# Patient Record
Sex: FEMALE | Race: WHITE | Employment: FULL TIME | URBAN - METROPOLITAN AREA
[De-identification: names, ages, dates, MRNs, and addresses within clinical notes are randomized per-mention and may not be internally consistent; named-entity substitution may affect disease eponyms.]

---

## 2017-06-07 ENCOUNTER — OFFICE VISIT (OUTPATIENT)
Dept: OBGYN CLINIC | Age: 37
End: 2017-06-07
Payer: COMMERCIAL

## 2017-06-07 ENCOUNTER — HOSPITAL ENCOUNTER (OUTPATIENT)
Age: 37
Setting detail: SPECIMEN
Discharge: HOME | End: 2017-06-07
Payer: COMMERCIAL

## 2017-06-07 VITALS
HEART RATE: 70 BPM | SYSTOLIC BLOOD PRESSURE: 112 MMHG | HEIGHT: 64 IN | RESPIRATION RATE: 17 BRPM | BODY MASS INDEX: 24.59 KG/M2 | DIASTOLIC BLOOD PRESSURE: 68 MMHG | WEIGHT: 144 LBS

## 2017-06-07 DIAGNOSIS — Z01.419 WELL FEMALE EXAM WITH ROUTINE GYNECOLOGICAL EXAM: Primary | ICD-10-CM

## 2017-06-07 DIAGNOSIS — Z11.51 SPECIAL SCREENING EXAMINATION FOR HUMAN PAPILLOMAVIRUS (HPV): ICD-10-CM

## 2017-06-07 PROCEDURE — 87624 HPV HI-RISK TYP POOLED RSLT: CPT

## 2017-06-07 PROCEDURE — G0145 SCR C/V CYTO,THINLAYER,RESCR: HCPCS

## 2017-06-07 PROCEDURE — 99395 PREV VISIT EST AGE 18-39: CPT | Performed by: ADVANCED PRACTICE MIDWIFE

## 2017-06-07 ASSESSMENT — PATIENT HEALTH QUESTIONNAIRE - PHQ9
SUM OF ALL RESPONSES TO PHQ9 QUESTIONS 1 & 2: 0
1. LITTLE INTEREST OR PLEASURE IN DOING THINGS: 0
2. FEELING DOWN, DEPRESSED OR HOPELESS: 0
SUM OF ALL RESPONSES TO PHQ QUESTIONS 1-9: 0

## 2017-06-08 LAB
HPV SAMPLE: NORMAL
HPV SOURCE: NORMAL
HPV, GENOTYPE 16: NOT DETECTED
HPV, GENOTYPE 18: NOT DETECTED
HPV, HIGH RISK OTHER: NOT DETECTED
HPV, INTERPRETATION: NORMAL

## 2017-06-09 LAB — CYTOLOGY REPORT: NORMAL

## 2017-11-30 ENCOUNTER — OFFICE VISIT (OUTPATIENT)
Dept: FAMILY MEDICINE CLINIC | Age: 37
End: 2017-11-30
Payer: COMMERCIAL

## 2017-11-30 VITALS
HEART RATE: 95 BPM | WEIGHT: 144 LBS | TEMPERATURE: 98.2 F | HEIGHT: 65 IN | RESPIRATION RATE: 16 BRPM | SYSTOLIC BLOOD PRESSURE: 126 MMHG | DIASTOLIC BLOOD PRESSURE: 84 MMHG | BODY MASS INDEX: 23.99 KG/M2 | OXYGEN SATURATION: 100 %

## 2017-11-30 DIAGNOSIS — J01.10 ACUTE FRONTAL SINUSITIS, RECURRENCE NOT SPECIFIED: Primary | ICD-10-CM

## 2017-11-30 DIAGNOSIS — M79.10 MYALGIA: ICD-10-CM

## 2017-11-30 LAB
INFLUENZA A ANTIBODY: NEGATIVE
INFLUENZA B ANTIBODY: NEGATIVE

## 2017-11-30 PROCEDURE — G8420 CALC BMI NORM PARAMETERS: HCPCS | Performed by: FAMILY MEDICINE

## 2017-11-30 PROCEDURE — G8427 DOCREV CUR MEDS BY ELIG CLIN: HCPCS | Performed by: FAMILY MEDICINE

## 2017-11-30 PROCEDURE — 99213 OFFICE O/P EST LOW 20 MIN: CPT | Performed by: FAMILY MEDICINE

## 2017-11-30 PROCEDURE — 1036F TOBACCO NON-USER: CPT | Performed by: FAMILY MEDICINE

## 2017-11-30 PROCEDURE — 87804 INFLUENZA ASSAY W/OPTIC: CPT | Performed by: FAMILY MEDICINE

## 2017-11-30 PROCEDURE — G8484 FLU IMMUNIZE NO ADMIN: HCPCS | Performed by: FAMILY MEDICINE

## 2017-11-30 RX ORDER — AMOXICILLIN 500 MG/1
500 TABLET, FILM COATED ORAL 3 TIMES DAILY
Qty: 30 TABLET | Refills: 0 | Status: SHIPPED | OUTPATIENT
Start: 2017-11-30 | End: 2017-12-10

## 2017-11-30 RX ORDER — ASCORBIC ACID 500 MG
500 TABLET ORAL DAILY
COMMUNITY

## 2017-11-30 RX ORDER — FLUCONAZOLE 150 MG/1
150 TABLET ORAL ONCE
Qty: 1 TABLET | Refills: 0 | Status: SHIPPED | OUTPATIENT
Start: 2017-11-30 | End: 2017-11-30

## 2017-11-30 RX ORDER — LANOLIN ALCOHOL/MO/W.PET/CERES
3000 CREAM (GRAM) TOPICAL DAILY
COMMUNITY

## 2017-11-30 RX ORDER — MULTIVITAMIN WITH IRON
250 TABLET ORAL DAILY
COMMUNITY

## 2017-11-30 ASSESSMENT — ENCOUNTER SYMPTOMS
EYE ITCHING: 0
WHEEZING: 0
SHORTNESS OF BREATH: 0
CHEST TIGHTNESS: 0
RHINORRHEA: 1
TROUBLE SWALLOWING: 0
NAUSEA: 0
DIARRHEA: 0
VOICE CHANGE: 0
VOMITING: 0
SINUS PAIN: 1
ABDOMINAL PAIN: 0
EYE DISCHARGE: 0
HEMOPTYSIS: 0
EYE REDNESS: 0
SORE THROAT: 0
CONSTIPATION: 0
COUGH: 1
SINUS PRESSURE: 1
BACK PAIN: 0

## 2017-11-30 NOTE — PATIENT INSTRUCTIONS
hot, wet towel or a warm gel pack on your face 3 or 4 times a day for 5 to 10 minutes each time. · Try a decongestant nasal spray like oxymetazoline (Afrin). Do not use it for more than 3 days in a row. Using it for more than 3 days can make your congestion worse. When should you call for help? Call your doctor now or seek immediate medical care if:  · You have new or worse swelling or redness in your face or around your eyes. · You have a new or higher fever. Watch closely for changes in your health, and be sure to contact your doctor if:  · You have new or worse facial pain. · The mucus from your nose becomes thicker (like pus) or has new blood in it. · You are not getting better as expected. Where can you learn more? Go to https://CommitChangepewisameb.[email or web address] org and sign in to your [employer] account. Enter F969 in the [employer] box to learn more about \"Sinusitis: Care Instructions. \"     If you do not have an account, please click on the \"Sign Up Now\" link. Current as of: July 29, 2016  Content Version: 11.3  © 8102-5926 [employer]. Care instructions adapted under license by [facility] ([facility] [facility]). If you have questions about a medical condition or this instruction, always ask your healthcare professional. [place] [address] any warranty or liability for your use of this information. Patient Education        Saline Nasal Washes: Care Instructions  Your Care Instructions  Saline nasal washes help keep the nasal passages open by washing out thick or dried mucus. This simple remedy can help relieve symptoms of allergies, sinusitis, and colds. It also can make the nose feel more comfortable by keeping the mucous membranes moist. You may notice a little burning sensation in your nose the first few times you use the solution, but this usually gets better in a few days. Follow-up care is a key part of your treatment and safety.  Be sure to make and go to all appointments, and call your doctor if you are having problems. It's also a good idea to know your test results and keep a list of the medicines you take. How can you care for yourself at home? · You can buy premixed saline solution in a squeeze bottle or other sinus rinse products at a drugstore. Read and follow the instructions on the label. · You also can make your own saline solution by adding 1 teaspoon of salt and 1 teaspoon of baking soda to 2 cups of distilled water. · If you use a homemade solution, pour a small amount into a clean bowl. Using a rubber bulb syringe, squeeze the syringe and place the tip in the salt water. Pull a small amount of the salt water into the syringe by relaxing your hand. · Sit down with your head tilted slightly back. Do not lie down. Put the tip of the bulb syringe or the squeeze bottle a little way into one of your nostrils. Gently drip or squirt a few drops into the nostril. Repeat with the other nostril. Some sneezing and gagging are normal at first.  · Gently blow your nose. · Wipe the syringe or bottle tip clean after each use. · Repeat this 2 or 3 times a day. · Use nasal washes gently if you have nosebleeds often. When should you call for help? Watch closely for changes in your health, and be sure to contact your doctor if:  · You often get nosebleeds. · You have problems doing the nasal washes. Where can you learn more? Go to https://Graphic Stadiumstephanie.Field [email or web address] org and sign in to your Tenantry Network account. Enter [phone] [id number] [phone] in the [facility] box to learn more about \"Saline Nasal Washes: Care Instructions. \"     If you do not have an account, please click on the \"Sign Up Now\" link. Current as of: May 4, 2017  Content Version: 11.3  © 7371-7450 [employer]. Care instructions adapted under license by [facility] ([facility]).  If you have questions about a medical condition or this instruction, always ask your healthcare professional. [address] any [email or web address] and sign in to your EnLink Geoenergy Serviceshart account. Enter G355 in the Image Searcherhire box to learn more about \"Muscle Aches: Care Instructions. \"     If you do not have an account, please click on the \"Sign Up Now\" link. Current as of: October 14, 2016  Content Version: 11.3  © 6427-5910 Alt[employer] Digital[employer]. Care instructions adapted under license by [facility] ([facility]). If you have questions about a medical condition or this instruction, always ask your healthcare professional. [address] any warranty or liability for your use of this information.

## 2017-11-30 NOTE — PROGRESS NOTES
[address] [facility]   [address] [facility]  [address] [facility] [address]  Dept: [phone]  Dept Fax: [phone]    Sharon Zimmer is a 40 y.o. female who presents today for her medical conditions/complaints as noted below. Sharon Zimmer is c/o of Cough (cough and congestion, thinks might be a sinus infection)        HPI:     Cough   This is a new problem. The current episode started 1 to 4 weeks ago (3 weeks ago felt like a mild flu, then had a cough that is going into her chest and during the past 1 week has had increased head congestion and muscle aches. ). The problem has been unchanged. The problem occurs every few minutes. The cough is non-productive. Associated symptoms include chills, ear congestion, headaches, myalgias, nasal congestion, postnasal drip and rhinorrhea. Pertinent negatives include no chest pain, ear pain, eye redness, fever, hemoptysis, rash, sore throat, shortness of breath, sweats or wheezing. Exacerbated by: bending over. Risk factors for lung disease include occupational exposure. Treatments tried: ibuprofen. The treatment provided moderate relief. There is no history of asthma, bronchitis, environmental allergies or pneumonia.        Past Medical History:   Diagnosis Date    Anxiety     Kidney stones     Vision loss of right eye       Past Surgical History:   Procedure Laterality Date    WISDOM TOOTH EXTRACTION  1999       Family History   Problem Relation Age of Onset    Thyroid Disease Mother     High Blood Pressure Father     Diabetes Father     Migraines Father     High Cholesterol Father     Heart Disease Father     High Blood Pressure Brother     Cancer Maternal Grandmother      spreaded throughout her body    Cancer Maternal Grandfather      asbestis       Social History   Substance Use Topics    Smoking status: Never Smoker    Smokeless tobacco: Never Used    Alcohol use No      Current Outpatient Prescriptions dizziness, weakness and light-headedness. Hematological: Negative for adenopathy. Psychiatric/Behavioral: The patient is not nervous/anxious. Objective:     Physical Exam   Constitutional: She is oriented to person, place, and time. She appears well-developed and well-nourished. No distress. HENT:   Head: Normocephalic. Right Ear: External ear normal.   Left Ear: External ear normal.   Nose: Mucosal edema, rhinorrhea and sinus tenderness present. Right sinus exhibits frontal sinus tenderness. Left sinus exhibits frontal sinus tenderness. Mouth/Throat: Uvula is midline and mucous membranes are normal. No uvula swelling. Posterior oropharyngeal erythema present. No oropharyngeal exudate or posterior oropharyngeal edema. Eyes: Conjunctivae and EOM are normal. Pupils are equal, round, and reactive to light. Right eye exhibits no discharge. Left eye exhibits no discharge. Neck: Normal range of motion. Neck supple. Cardiovascular: Normal rate, regular rhythm and normal heart sounds. No murmur heard. Pulmonary/Chest: Effort normal and breath sounds normal. No respiratory distress. She has no wheezes. She has no rales. Abdominal: Soft. Bowel sounds are normal. She exhibits no distension and no mass. There is no tenderness. Musculoskeletal: Normal range of motion. She exhibits no edema or tenderness. Lymphadenopathy:     She has cervical adenopathy. Neurological: She is alert and oriented to person, place, and time. No cranial nerve deficit. Coordination normal.   Skin: Skin is warm. No rash noted. She is not diaphoretic. Psychiatric: She has a normal mood and affect. Her behavior is normal. Judgment and thought content normal.   Nursing note and vitals reviewed.     /84 (Site: Left Arm, Position: Sitting, Cuff Size: Medium Adult)   Pulse 95   Temp 98.2 °F (36.8 °C) (Oral)   Resp 16   Ht 5' 4.5\" (1.638 m)   Wt 144 lb (65.3 kg)   SpO2 100%   BMI 24.34 kg/m²     Assessment:

## 2018-06-26 ENCOUNTER — OFFICE VISIT (OUTPATIENT)
Dept: OBGYN CLINIC | Age: 38
End: 2018-06-26
Payer: COMMERCIAL

## 2018-06-26 VITALS
RESPIRATION RATE: 16 BRPM | BODY MASS INDEX: 24.07 KG/M2 | WEIGHT: 141 LBS | HEART RATE: 72 BPM | DIASTOLIC BLOOD PRESSURE: 70 MMHG | SYSTOLIC BLOOD PRESSURE: 114 MMHG | HEIGHT: 64 IN

## 2018-06-26 DIAGNOSIS — Z01.419 ENCOUNTER FOR GYNECOLOGICAL EXAMINATION (GENERAL) (ROUTINE) WITHOUT ABNORMAL FINDINGS: Primary | ICD-10-CM

## 2018-06-26 PROCEDURE — 99395 PREV VISIT EST AGE 18-39: CPT | Performed by: ADVANCED PRACTICE MIDWIFE

## 2018-06-26 ASSESSMENT — PATIENT HEALTH QUESTIONNAIRE - PHQ9
2. FEELING DOWN, DEPRESSED OR HOPELESS: 0
SUM OF ALL RESPONSES TO PHQ9 QUESTIONS 1 & 2: 0
1. LITTLE INTEREST OR PLEASURE IN DOING THINGS: 0
SUM OF ALL RESPONSES TO PHQ QUESTIONS 1-9: 0

## 2019-01-02 ENCOUNTER — OFFICE VISIT (OUTPATIENT)
Dept: FAMILY MEDICINE CLINIC | Age: 39
End: 2019-01-02
Payer: COMMERCIAL

## 2019-01-02 VITALS
RESPIRATION RATE: 16 BRPM | WEIGHT: 143 LBS | BODY MASS INDEX: 23.82 KG/M2 | HEART RATE: 76 BPM | OXYGEN SATURATION: 100 % | HEIGHT: 65 IN | TEMPERATURE: 98.6 F | SYSTOLIC BLOOD PRESSURE: 92 MMHG | DIASTOLIC BLOOD PRESSURE: 76 MMHG

## 2019-01-02 DIAGNOSIS — N30.00 ACUTE CYSTITIS WITHOUT HEMATURIA: Primary | ICD-10-CM

## 2019-01-02 LAB
BILIRUBIN, POC: NEGATIVE
BLOOD URINE, POC: ABNORMAL
CLARITY, POC: CLEAR
COLOR, POC: YELLOW
GLUCOSE URINE, POC: NEGATIVE
KETONES, POC: NEGATIVE
LEUKOCYTE EST, POC: ABNORMAL
NITRITE, POC: NEGATIVE
PH, POC: 5.5
PROTEIN, POC: ABNORMAL
SPECIFIC GRAVITY, POC: >=1.03
UROBILINOGEN, POC: ABNORMAL

## 2019-01-02 PROCEDURE — 1036F TOBACCO NON-USER: CPT | Performed by: FAMILY MEDICINE

## 2019-01-02 PROCEDURE — 99214 OFFICE O/P EST MOD 30 MIN: CPT | Performed by: FAMILY MEDICINE

## 2019-01-02 PROCEDURE — G8484 FLU IMMUNIZE NO ADMIN: HCPCS | Performed by: FAMILY MEDICINE

## 2019-01-02 PROCEDURE — G8427 DOCREV CUR MEDS BY ELIG CLIN: HCPCS | Performed by: FAMILY MEDICINE

## 2019-01-02 PROCEDURE — G8420 CALC BMI NORM PARAMETERS: HCPCS | Performed by: FAMILY MEDICINE

## 2019-01-02 PROCEDURE — 81002 URINALYSIS NONAUTO W/O SCOPE: CPT | Performed by: FAMILY MEDICINE

## 2019-01-02 RX ORDER — PHENAZOPYRIDINE HYDROCHLORIDE 100 MG/1
100 TABLET, FILM COATED ORAL 3 TIMES DAILY PRN
Qty: 6 TABLET | Refills: 0 | Status: SHIPPED | OUTPATIENT
Start: 2019-01-02 | End: 2019-01-04

## 2019-01-02 RX ORDER — FLUCONAZOLE 150 MG/1
TABLET ORAL
Qty: 2 TABLET | Refills: 1 | Status: SHIPPED | OUTPATIENT
Start: 2019-01-02 | End: 2021-06-29

## 2019-01-02 RX ORDER — NITROFURANTOIN 25; 75 MG/1; MG/1
100 CAPSULE ORAL 2 TIMES DAILY
Qty: 20 CAPSULE | Refills: 0 | Status: SHIPPED | OUTPATIENT
Start: 2019-01-02 | End: 2019-01-12

## 2019-01-02 ASSESSMENT — ENCOUNTER SYMPTOMS
GASTROINTESTINAL NEGATIVE: 1
EYES NEGATIVE: 1
ALLERGIC/IMMUNOLOGIC NEGATIVE: 1
RESPIRATORY NEGATIVE: 1

## 2019-06-26 ENCOUNTER — OFFICE VISIT (OUTPATIENT)
Dept: OBGYN CLINIC | Age: 39
End: 2019-06-26
Payer: COMMERCIAL

## 2019-06-26 ENCOUNTER — HOSPITAL ENCOUNTER (OUTPATIENT)
Age: 39
Setting detail: SPECIMEN
Discharge: HOME | End: 2019-06-26
Payer: COMMERCIAL

## 2019-06-26 VITALS
HEIGHT: 64 IN | HEART RATE: 78 BPM | WEIGHT: 146 LBS | DIASTOLIC BLOOD PRESSURE: 72 MMHG | SYSTOLIC BLOOD PRESSURE: 104 MMHG | BODY MASS INDEX: 24.92 KG/M2

## 2019-06-26 DIAGNOSIS — Z01.419 WOMEN'S ANNUAL ROUTINE GYNECOLOGICAL EXAMINATION: Primary | ICD-10-CM

## 2019-06-26 DIAGNOSIS — Z01.419 WOMEN'S ANNUAL ROUTINE GYNECOLOGICAL EXAMINATION: ICD-10-CM

## 2019-06-26 DIAGNOSIS — Z11.51 SCREENING FOR HPV (HUMAN PAPILLOMAVIRUS): ICD-10-CM

## 2019-06-26 PROCEDURE — 87624 HPV HI-RISK TYP POOLED RSLT: CPT

## 2019-06-26 PROCEDURE — 99395 PREV VISIT EST AGE 18-39: CPT | Performed by: NURSE PRACTITIONER

## 2019-06-26 PROCEDURE — G0145 SCR C/V CYTO,THINLAYER,RESCR: HCPCS

## 2019-06-26 ASSESSMENT — PATIENT HEALTH QUESTIONNAIRE - PHQ9
2. FEELING DOWN, DEPRESSED OR HOPELESS: 0
SUM OF ALL RESPONSES TO PHQ9 QUESTIONS 1 & 2: 0
1. LITTLE INTEREST OR PLEASURE IN DOING THINGS: 0
SUM OF ALL RESPONSES TO PHQ QUESTIONS 1-9: 0
SUM OF ALL RESPONSES TO PHQ QUESTIONS 1-9: 0

## 2019-06-26 NOTE — PROGRESS NOTES
History and Physical  [address], [address] [place] [address], [id number] [place] [address] [phone]   Fax [phone]  Jolie Blevins  2019              45 y.o. Chief Complaint   Patient presents with    Gynecologic Exam       Patient's last menstrual period was 2019 (exact date). Primary Care Physician: No primary care provider on file. The patient was seen and examined. She has no chief complaint today and is here for her annual exam.  Her bowels are regular. There are no voiding complaints. She denies any bloating. She denies vaginal discharge and was counseled on STD's and the need for barrier contraception.      HPI : Jolie Blevins is a 45 y.o. female     Annual exam  No chief complaint  ________________________________________________________________________  OB History    Para Term  AB Living   2 2 2 0 0 2   SAB TAB Ectopic Molar Multiple Live Births   0 0 0 0 0 2      # Outcome Date GA Lbr Matheus/2nd Weight Sex Delivery Anes PTL Lv   2 Term 09 39w5d  7 lb 10 oz (3.459 kg) M Vag-Spont   XIAO      Name: Montse Jimenez: 8  Apgar5: 9   1 Term 06 39w0d  6 lb 4 oz (2.835 kg) F    XIAO      Name: Jeferson Page      Past Medical History:   Diagnosis Date    Anxiety     Kidney stones     Vision loss of right eye                                                                    Past Surgical History:   Procedure Laterality Date    WISDOM TOOTH EXTRACTION       Family History   Problem Relation Age of Onset    Thyroid Disease Mother     High Blood Pressure Father     Diabetes Father     Migraines Father     High Cholesterol Father     Heart Disease Father     High Blood Pressure Brother     Cancer Maternal Grandmother         spreaded throughout her body    Cancer Maternal Grandfather         asbestis     Social History     Socioeconomic History    Marital status:      Spouse name: Not on file    Number of children: Not on file    Years of education: Not on file    Highest education level: Not on file   Occupational History    Not on file   Social Needs    Financial resource strain: Not on file    Food insecurity:     Worry: Not on file     Inability: Not on file    Transportation needs:     Medical: Not on file     Non-medical: Not on file   Tobacco Use    Smoking status: Never Smoker    Smokeless tobacco: Never Used   Substance and Sexual Activity    Alcohol use: No    Drug use: No    Sexual activity: Yes     Partners: Male   Lifestyle    Physical activity:     Days per week: Not on file     Minutes per session: Not on file    Stress: Not on file   Relationships    Social connections:     Talks on phone: Not on file     Gets together: Not on file     Attends Oriental orthodox service: Not on file     Active member of club or organization: Not on file     Attends meetings of clubs or organizations: Not on file     Relationship status: Not on file    Intimate partner violence:     Fear of current or ex partner: Not on file     Emotionally abused: Not on file     Physically abused: Not on file     Forced sexual activity: Not on file   Other Topics Concern    Not on file   Social History Narrative    Not on file       MEDICATIONS:  Current Outpatient Medications   Medication Sig Dispense Refill    vitamin B-12 (CYANOCOBALAMIN) 1000 MCG tablet Take 3,000 mcg by mouth daily      vitamin C (ASCORBIC ACID) 500 MG tablet Take 500 mg by mouth daily      magnesium (MAGNESIUM-OXIDE) 250 MG TABS tablet Take 250 mg by mouth daily      Multiple Vitamin TABS Take 1 tablet by mouth.  fluconazole (DIFLUCAN) 150 MG tablet Take 1 tablet after antibiotic use. . Then take 1 in 7 days. . 2 tablet 1     No current facility-administered medications for this visit.             ALLERGIES:  Allergies as of 06/26/2019 - Review Complete 06/26/2019   Allergen Reaction Noted    Bactrim [sulfamethoxazole-trimethoprim] Rash 12/08/2016       Symptoms of decreased mood absent  Symptoms of anhedonia absent    **If either question is answered in a  positive fashion then complete the PHQ9 Scoring Evaluation and make the appropriate referral**      Immunization status: stated as current, but no records available. Gynecologic History:  Menarche: 15 yo  Menopause at [address] [place] yo     Patient's last menstrual period was 06/04/2019 (exact date). Sexually Active: Yes    STD History: No     Permanent Sterilization: No ( with vasectomy)  Reversible Birth Control: No        Hormone Replacement Exposure: No      Genetic Qualified Family History of Breast, Ovarian , Colon or Uterine Cancer: No     If YES see scanned worksheet. Preventative Health Testing:    Health Maintenance:  Health Maintenance Due   Topic Date Due    Varicella Vaccine (1 of 2 - 13+ 2-dose series) 08/15/1993    HIV screen  08/15/1995    DTaP/Tdap/Td vaccine (1 - Tdap) 08/15/1999       Date of Last Pap Smear: 6/7/2017 neg/neg  Abnormal Pap Smear History: denies  Colposcopy History:   Date of Last Mammogram: NA  Date of Last Colonoscopy:   Date of Last Bone Density:      ________________________________________________________________________  REVIEW OF SYSTEMS:    yes   A minimum of an eleven point review of systems was completed. Review Of Systems (11 point):  Constitutional: No fever, chills or malaise;  No weight change or fatigue  Head and Eyes: No vision, Headache, Dizziness or trauma in last 12 months  ENT ROS: No hearing, Tinnitis, sinus or taste problems  Hematological and Lymphatic ROS:No Lymphoma, Von Willebrand's, Hemophillia or Bleeding History  Psych ROS: No Depression, Homicidal thoughts,suicidal thoughts, or anxiety  Breast ROS: No prior breast abnormalities or lumps  Respiratory ROS: No SOB, Pneumoniae,Cough, or Pulmonary Embolism History  Cardiovascular ROS: No Chest Pain with Exertion, Palpitations, Syncope, Edema, Arrhythmia  Gastrointestinal ROS: applicable. Extremities: The patients extremities were without calf tenderness, edema, or varicosities. There was full range of motion in all four extremities. Pulses in all four extremities were appreciated and are 2/4. Abdomen: The abdomen was soft and non-tender. There were good bowel sounds in all quadrants and there was no guarding, rebound or rigidity. On evaluation there was no evidence of hepatosplenomegaly and there was no costal vertebral ivette tenderness bilaterally. No hernias were appreciated. Abdominal Scars: intact    Psych: The patient had a normal Orientation to: Time, Place, Person, and Situation  There is no Mood / Affect changes    Breast:  (Chest)  normal appearance, no masses or tenderness  Self breast exams were reviewed in detail. Literature was given. Pelvic Exam:  Vulva and vagina appear normal. Bimanual exam reveals normal uterus and adnexa. Rectal Exam:  exam declined by patient          Musculosk:  Normal Gait and station was noted. Digits were evaluated without abnormal findings. Range of motion, stability and strength were evaluated and found to be appropriate for the patients age. ASSESSMENT:      45 y.o. Annual   Diagnosis Orders   1. Women's annual routine gynecological examination  PAP SMEAR   2. Screening for HPV (human papillomavirus)  PAP SMEAR          Chief Complaint   Patient presents with    Gynecologic Exam          Past Medical History:   Diagnosis Date    Anxiety     Kidney stones     Vision loss of right eye          Patient Active Problem List    Diagnosis Date Noted    Anxiety     Sudden visual loss 11/06/2013    Acute retrobulbar neuritis 11/06/2013    Regular astigmatism 07/17/2013    Myopia 07/17/2013          Hereditary Breast, Ovarian, Colon and Uterine Cancer screening Done.           Tobacco & Secondary smoke risks reviewed; instructed on cessation and avoidance      Counseling Completed:  Preventative Health Recommendations and Follow up. The patient was informed of the recommended preventative health recommendations. 1. Annuals every year; Cytology collections per prevailing guidelines. 2. Mammograms begin every year at 35 yo if no abnormalities are found and no family     History. 3. Bone density studies every 2-3 years. Begin at 73 yo. If no fracture history or osteoporosis family history. (significant). 4. Colonoscopy begin at 40 yo. Repeat every ten years if negative and no family history. 5. Calcium of 3874-2153 mg/day in split dosing  6. Vitamin D 400-800 IU/day  7. All other preventative health recommendations will be managed by the patients Primary care physician. PLAN:  Return in about 1 year (around 6/26/2020) for annual.   Pap smear obtained. Repeat Annual every 1 year  Cervical Cytology Evaluation begins at 24years old. If Negative Cytology, Follow-up screening per current guidelines. Mammograms every 1 year. If 35 yo and last mammogram was negative. Calcium and Vitamin D dosing reviewed. Colonoscopy screening reviewed as well as onset for bone density testing. Birth control and barrier recommendations discussed. STD counseling and prevention reviewed. Gardisil counseling completed for all patients 10-35 yo. Routine health maintenance per patients PCP. Orders Placed This Encounter   Procedures    PAP SMEAR     Patient History:    Patient's last menstrual period was 06/04/2019 (exact date). OBGYN Status: Having periods  Past Surgical History:  1999: WISDOM TOOTH EXTRACTION      Social History    Tobacco Use      Smoking status: Never Smoker      Smokeless tobacco: Never Used       Standing Status:   Future     Standing Expiration Date:   6/25/2020     Order Specific Question:   Collection Type     Answer:    Thin Prep     Order Specific Question:   Prior Abnormal Pap Test     Answer:   No     Order Specific Question:   Screening or Diagnostic     Answer:   Screening Order Specific Question:   HPV Requested?      Answer:   Yes     Order Specific Question:   High Risk Patient     Answer:   N/A

## 2019-06-29 LAB
HPV SAMPLE: NORMAL
HPV, GENOTYPE 16: NOT DETECTED
HPV, GENOTYPE 18: NOT DETECTED
HPV, HIGH RISK OTHER: NOT DETECTED
HPV, INTERPRETATION: NORMAL
SPECIMEN DESCRIPTION: NORMAL

## 2019-07-01 LAB — CYTOLOGY REPORT: NORMAL

## 2019-07-03 ENCOUNTER — TELEPHONE (OUTPATIENT)
Dept: OBGYN CLINIC | Age: 39
End: 2019-07-03

## 2019-07-03 NOTE — TELEPHONE ENCOUNTER
----- Message from AMADOU Campo CNP sent at 7/3/2019  7:06 AM EDT -----  Pap smear ASCUS  HPV not detected  AGE 38  Repeat pap smear in 1 year. Please let patient know.

## 2019-07-03 NOTE — TELEPHONE ENCOUNTER
Spoke with patient in regards to pap results, ascus with negative hpv, per Gabriele Morton CNP. Patient to repeat pap in one year as recommended by provider. Patient verbalized an understanding of results and plan of care.

## 2019-09-10 ENCOUNTER — OFFICE VISIT (OUTPATIENT)
Dept: FAMILY MEDICINE CLINIC | Age: 39
End: 2019-09-10
Payer: COMMERCIAL

## 2019-09-10 ENCOUNTER — HOSPITAL ENCOUNTER (OUTPATIENT)
Age: 39
Setting detail: SPECIMEN
Discharge: HOME | End: 2019-09-10
Payer: COMMERCIAL

## 2019-09-10 VITALS
DIASTOLIC BLOOD PRESSURE: 78 MMHG | OXYGEN SATURATION: 99 % | WEIGHT: 144 LBS | BODY MASS INDEX: 24.72 KG/M2 | TEMPERATURE: 98.8 F | HEART RATE: 98 BPM | SYSTOLIC BLOOD PRESSURE: 116 MMHG

## 2019-09-10 DIAGNOSIS — R10.9 FLANK PAIN: ICD-10-CM

## 2019-09-10 DIAGNOSIS — R39.9 UTI SYMPTOMS: ICD-10-CM

## 2019-09-10 DIAGNOSIS — R39.9 UTI SYMPTOMS: Primary | ICD-10-CM

## 2019-09-10 LAB
-: ABNORMAL
AMORPHOUS: ABNORMAL
BACTERIA: ABNORMAL
BILIRUBIN URINE: NEGATIVE
BILIRUBIN, POC: ABNORMAL
BLOOD URINE, POC: ABNORMAL
CASTS UA: ABNORMAL /LPF (ref 0–8)
CLARITY, POC: ABNORMAL
COLOR, POC: YELLOW
COLOR: ABNORMAL
COMMENT UA: ABNORMAL
CONTROL: YES
CRYSTALS, UA: ABNORMAL /HPF
EPITHELIAL CELLS UA: ABNORMAL /HPF (ref 0–5)
GLUCOSE URINE, POC: ABNORMAL
GLUCOSE URINE: NEGATIVE
KETONES, POC: ABNORMAL
KETONES, URINE: NEGATIVE
LEUKOCYTE EST, POC: ABNORMAL
LEUKOCYTE ESTERASE, URINE: ABNORMAL
MUCUS: ABNORMAL
NITRITE, POC: ABNORMAL
NITRITE, URINE: POSITIVE
OTHER OBSERVATIONS UA: ABNORMAL
PH UA: 6.5 (ref 5–8)
PH, POC: 5.5
PREGNANCY TEST URINE, POC: NORMAL
PROTEIN UA: ABNORMAL
PROTEIN, POC: 30
RBC UA: ABNORMAL /HPF (ref 0–4)
RENAL EPITHELIAL, UA: ABNORMAL /HPF
SPECIFIC GRAVITY UA: 1.03 (ref 1–1.03)
SPECIFIC GRAVITY, POC: 1.03
TRICHOMONAS: ABNORMAL
TURBIDITY: ABNORMAL
URINE HGB: ABNORMAL
UROBILINOGEN, POC: 0.2
UROBILINOGEN, URINE: NORMAL
WBC UA: ABNORMAL /HPF (ref 0–5)
YEAST: ABNORMAL

## 2019-09-10 PROCEDURE — G8427 DOCREV CUR MEDS BY ELIG CLIN: HCPCS | Performed by: NURSE PRACTITIONER

## 2019-09-10 PROCEDURE — 81025 URINE PREGNANCY TEST: CPT | Performed by: NURSE PRACTITIONER

## 2019-09-10 PROCEDURE — 81002 URINALYSIS NONAUTO W/O SCOPE: CPT | Performed by: NURSE PRACTITIONER

## 2019-09-10 PROCEDURE — 99214 OFFICE O/P EST MOD 30 MIN: CPT | Performed by: NURSE PRACTITIONER

## 2019-09-10 PROCEDURE — 1036F TOBACCO NON-USER: CPT | Performed by: NURSE PRACTITIONER

## 2019-09-10 PROCEDURE — G8420 CALC BMI NORM PARAMETERS: HCPCS | Performed by: NURSE PRACTITIONER

## 2019-09-10 RX ORDER — CIPROFLOXACIN 500 MG/1
500 TABLET, FILM COATED ORAL 2 TIMES DAILY
Qty: 20 TABLET | Refills: 0 | Status: SHIPPED | OUTPATIENT
Start: 2019-09-10 | End: 2019-09-20

## 2019-09-10 ASSESSMENT — ENCOUNTER SYMPTOMS
COUGH: 0
CHEST TIGHTNESS: 0
SORE THROAT: 0
EYES NEGATIVE: 1
VOMITING: 0
BOWEL INCONTINENCE: 0
DIARRHEA: 0
EYE ITCHING: 0
ALLERGIC/IMMUNOLOGIC NEGATIVE: 1
NAUSEA: 1
ABDOMINAL PAIN: 0
EYE DISCHARGE: 0
SHORTNESS OF BREATH: 0

## 2019-09-10 NOTE — PROGRESS NOTES
POC Neg     Bilirubin, UA Neg     Ketones, UA Neg     Spec Grav, UA 1.030     Blood, UA POC Trace-intact     pH, UA 5.5     Protein, UA POC 30     Urobilinogen, UA 0.2     Leukocytes, UA Large     Nitrite, UA Pos        Assessment:       Diagnosis Orders   1. UTI symptoms  POCT Urinalysis no Micro    Urinalysis Reflex to Culture    POCT urine pregnancy    ciprofloxacin (CIPRO) 500 MG tablet    CT ABDOMEN PELVIS WO CONTRAST   2. Flank pain  POCT Urinalysis no Micro    Urinalysis Reflex to Culture    POCT urine pregnancy    CT ABDOMEN PELVIS WO CONTRAST             Plan:     1.) POCT U/A- noted above   2.) Urine Culture sent to lab- will call with results   3.) POCT Preg- negative   4.) Start Cipro today   5.) Increase water intake   6.) Stat CT Abd/pelvis- will call with results and TX accordingly   7.) RTO PRN   8.) Follow-up with PCP PRN     Problem List     None           Patient given educationalmaterials - see patient instructions. Discussed use, benefit, and side effectsof prescribed medications. All patient questions answered. Pt verbalized understanding. Instructed to continue current medications, diet and exercise. Patient agreedwith treatment plan. Follow up as directed.      Electronically signed by Hetty Nyhan, APRN - CNP on 9/10/2019 at 10:18 AM

## 2019-09-12 LAB
CULTURE: ABNORMAL
Lab: ABNORMAL
SPECIMEN DESCRIPTION: ABNORMAL

## 2019-12-02 ENCOUNTER — OFFICE VISIT (OUTPATIENT)
Dept: FAMILY MEDICINE CLINIC | Age: 39
End: 2019-12-02
Payer: COMMERCIAL

## 2019-12-02 VITALS
OXYGEN SATURATION: 100 % | SYSTOLIC BLOOD PRESSURE: 110 MMHG | TEMPERATURE: 98.9 F | WEIGHT: 144 LBS | DIASTOLIC BLOOD PRESSURE: 79 MMHG | HEART RATE: 96 BPM | BODY MASS INDEX: 24.72 KG/M2

## 2019-12-02 DIAGNOSIS — J01.90 ACUTE NON-RECURRENT SINUSITIS, UNSPECIFIED LOCATION: Primary | ICD-10-CM

## 2019-12-02 PROCEDURE — 99214 OFFICE O/P EST MOD 30 MIN: CPT | Performed by: FAMILY MEDICINE

## 2019-12-02 RX ORDER — AMOXICILLIN 500 MG/1
500 CAPSULE ORAL 3 TIMES DAILY
Qty: 30 CAPSULE | Refills: 0 | Status: SHIPPED | OUTPATIENT
Start: 2019-12-02 | End: 2019-12-12

## 2019-12-02 ASSESSMENT — ENCOUNTER SYMPTOMS
NAUSEA: 0
SINUS PRESSURE: 1
SINUS PAIN: 1
SORE THROAT: 1
SHORTNESS OF BREATH: 0
SINUS COMPLAINT: 1
VOMITING: 0
DIARRHEA: 0
COUGH: 0

## 2020-02-26 ENCOUNTER — OFFICE VISIT (OUTPATIENT)
Dept: FAMILY MEDICINE CLINIC | Age: 40
End: 2020-02-26
Payer: COMMERCIAL

## 2020-02-26 VITALS
SYSTOLIC BLOOD PRESSURE: 129 MMHG | BODY MASS INDEX: 25.1 KG/M2 | WEIGHT: 147 LBS | OXYGEN SATURATION: 99 % | RESPIRATION RATE: 16 BRPM | DIASTOLIC BLOOD PRESSURE: 81 MMHG | TEMPERATURE: 99.7 F | HEART RATE: 99 BPM | HEIGHT: 64 IN

## 2020-02-26 PROCEDURE — G8427 DOCREV CUR MEDS BY ELIG CLIN: HCPCS | Performed by: FAMILY MEDICINE

## 2020-02-26 PROCEDURE — 1036F TOBACCO NON-USER: CPT | Performed by: FAMILY MEDICINE

## 2020-02-26 PROCEDURE — 99214 OFFICE O/P EST MOD 30 MIN: CPT | Performed by: FAMILY MEDICINE

## 2020-02-26 PROCEDURE — G8419 CALC BMI OUT NRM PARAM NOF/U: HCPCS | Performed by: FAMILY MEDICINE

## 2020-02-26 PROCEDURE — G8484 FLU IMMUNIZE NO ADMIN: HCPCS | Performed by: FAMILY MEDICINE

## 2020-02-26 RX ORDER — AMOXICILLIN AND CLAVULANATE POTASSIUM 875; 125 MG/1; MG/1
1 TABLET, FILM COATED ORAL 2 TIMES DAILY
Qty: 14 TABLET | Refills: 0 | Status: SHIPPED | OUTPATIENT
Start: 2020-02-26 | End: 2020-03-04

## 2020-02-26 ASSESSMENT — PATIENT HEALTH QUESTIONNAIRE - PHQ9: DEPRESSION UNABLE TO ASSESS: PT REFUSES

## 2020-02-26 ASSESSMENT — ENCOUNTER SYMPTOMS
SINUS PRESSURE: 1
SHORTNESS OF BREATH: 0
VOMITING: 0
NAUSEA: 1
WHEEZING: 0
CHANGE IN BOWEL HABIT: 0
SORE THROAT: 1
SINUS PAIN: 1
ABDOMINAL PAIN: 0
COUGH: 0
DIARRHEA: 0

## 2020-02-26 NOTE — LETTER
[facility]   [address] [facility]  Phone: [phone]  Fax: [phone]    Tru Good MD        February 26, 2020     Patient: Ronan Kiser   YOB: 1980   Date of Visit: 2/26/2020       To Whom it May Concern:    Lucy Rosas was seen in my clinic on 2/26/2020. She is to be excused from work on 2/26/2020. If you have any questions or concerns, please don't hesitate to call.     Sincerely,           Tru Good MD

## 2020-02-27 NOTE — PROGRESS NOTES
[address] [facility]   [place] [facility] K[facility]  Dept: [phone]  Dept Fax: [phone]    Donita Saldana is a 44 y.o. female who presents today for her medical conditions/complaintsas noted below. Donita Saldana is c/o of Pharyngitis (headache, sinus congestion, left ear pain x two days )        HPI:     Pharyngitis   This is a new problem. The current episode started yesterday. The problem occurs constantly. The problem has been rapidly worsening. Associated symptoms include congestion, a fever (unmeasured), headaches, nausea and a sore throat. Pertinent negatives include no abdominal pain, change in bowel habit, chills, coughing, myalgias, rash or vomiting. Associated symptoms comments: Left ear pain. Exacerbated by: laying flat. She has tried NSAIDs for the symptoms. The treatment provided mild relief. 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   Past Medical History:   Diagnosis Date    Anxiety     Kidney stones     Vision loss of right eye     Past medical history reviewed and pertinent positives/negatives in the HPI    Past Surgical History:   Procedure Laterality Date    WISDOM TOOTH EXTRACTION  1999       Family History   Problem Relation Age of Onset    Thyroid Disease Mother     High Blood Pressure Father     Diabetes Father     Migraines Father     High Cholesterol Father     Heart Disease Father     High Blood Pressure Brother     Cancer Maternal Grandmother         spreaded throughout her body    Cancer Maternal Grandfather         asbestis       Social History     Tobacco Use    Smoking status: Never Smoker    Smokeless tobacco: Never Used   Substance Use Topics    Alcohol use: No      Current Outpatient Medications   Medication Sig Dispense Refill    amoxicillin-clavulanate (AUGMENTIN) 875-125 MG per tablet Take 1 tablet by mouth 2 times daily for 7 days 14 tablet 0    vitamin B-12 (CYANOCOBALAMIN) 1000 MCG tablet Take 3,000 mcg by mouth daily      vitamin C (ASCORBIC ACID) 500 MG tablet Take 500 mg by mouth daily      magnesium (MAGNESIUM-OXIDE) 250 MG TABS tablet Take 250 mg by mouth daily      Multiple Vitamin TABS Take 1 tablet by mouth.  fluconazole (DIFLUCAN) 150 MG tablet Take 1 tablet after antibiotic use. . Then take 1 in 7 days. . (Patient not taking: Reported on 9/10/2019) 2 tablet 1     No current facility-administered medications for this visit. Allergies   Allergen Reactions    Bactrim [Sulfamethoxazole-Trimethoprim] Rash       Health Maintenance   Topic Date Due    Varicella vaccine (1 of 2 - 2-dose childhood series) 08/15/1981    DTaP/Tdap/Td vaccine (1 - Tdap) 08/15/1991    HIV screen  08/15/1995    Flu vaccine (1) 09/01/2019    Cervical cancer screen  06/26/2024    Shingles Vaccine (1 of 2) 08/15/2030    Hepatitis A vaccine  Aged Out    Hepatitis B vaccine  Aged Out    Hib vaccine  Aged Out    Meningococcal (ACWY) vaccine  Aged Out    Pneumococcal 0-64 years Vaccine  Aged Out       :      Review of Systems   Constitutional: Positive for fever (unmeasured). Negative for chills. HENT: Positive for congestion, ear pain, sinus pressure, sinus pain and sore throat. Respiratory: Negative for cough, shortness of breath and wheezing. Gastrointestinal: Positive for nausea. Negative for abdominal pain, change in bowel habit, diarrhea and vomiting. Musculoskeletal: Negative for myalgias. Skin: Negative for rash and wound. Neurological: Positive for headaches. Hematological: Negative for adenopathy. Objective:     Physical Exam  Vitals signs and nursing note reviewed. Constitutional:       Appearance: Normal appearance. She is ill-appearing. HENT:      Head: Normocephalic and atraumatic. Right Ear: Hearing, ear canal and external ear normal. A middle ear effusion is present. Tympanic membrane is bulging.       Left Ear: Hearing, ear canal and external ear normal. A middle ear effusion is present. Tympanic membrane is bulging. Nose: Mucosal edema and congestion present. Right Sinus: Frontal sinus tenderness present. No maxillary sinus tenderness. Left Sinus: Frontal sinus tenderness present. No maxillary sinus tenderness. Mouth/Throat:      Lips: Pink. Mouth: Mucous membranes are moist.      Pharynx: Pharyngeal swelling and posterior oropharyngeal erythema present. No oropharyngeal exudate. Tonsils: No tonsillar exudate. Eyes:      Extraocular Movements: Extraocular movements intact. Conjunctiva/sclera: Conjunctivae normal.   Neck:      Musculoskeletal: No muscular tenderness. Cardiovascular:      Rate and Rhythm: Normal rate and regular rhythm. Heart sounds: Normal heart sounds. Pulmonary:      Effort: Pulmonary effort is normal.      Breath sounds: Normal breath sounds. Lymphadenopathy:      Cervical: No cervical adenopathy. Skin:     General: Skin is warm and dry. Neurological:      Mental Status: She is alert and oriented to person, place, and time. Mental status is at baseline. Psychiatric:         Mood and Affect: Mood normal.         Behavior: Behavior normal.         Thought Content: Thought content normal.         Judgment: Judgment normal.       /81 (Site: Left Upper Arm, Position: Sitting, Cuff Size: Medium Adult)   Pulse 99   Temp 99.7 °F (37.6 °C) (Oral)   Resp 16   Ht 5' 4\" (1.626 m)   Wt 147 lb (66.7 kg)   SpO2 99%   BMI 25.23 kg/m²     Assessment:       Diagnosis Orders   1. Acute bacterial sinusitis         Plan:   Due to severity and rapid worsening of symptoms we will treat as a bacterial infection at this time  1.) Take Augmentin as prescribed for sinus infection. Continue Tylenol/Motrin as needed. May use Flonase or nasal saline to help with congestion. Drink plenty of fluids.   2.)If symptoms worsen or do not improve please follow-up with PCP or return to clinic    No orders of the defined types were placed in this encounter. Orders Placed This Encounter   Medications    amoxicillin-clavulanate (AUGMENTIN) 875-125 MG per tablet     Sig: Take 1 tablet by mouth 2 times daily for 7 days     Dispense:  14 tablet     Refill:  0      Patient given educational materials - see patient instructions. Discussed use, benefit, and side effects of prescribed medications. All patient questions answered. Pt voiced understanding. Patient agreed with treatment plan. Follow up as directed.      Electronicallysigned by Chelsea Gray MD on 2/26/2020 at 7:26 PM

## 2020-02-27 NOTE — PATIENT INSTRUCTIONS
Patient Education        Sinusitis: Care Instructions  Your Care Instructions    Sinusitis is an infection of the lining of the sinus cavities in your head. Sinusitis often follows a cold. It causes pain and pressure in your head and face. In most cases, sinusitis gets better on its own in 1 to 2 weeks. But some mild symptoms may last for several weeks. Sometimes antibiotics are needed. Follow-up care is a key part of your treatment and safety. Be sure to make and go to all appointments, and call your doctor if you are having problems. It's also a good idea to know your test results and keep a list of the medicines you take. How can you care for yourself at home? · Take an over-the-counter pain medicine, such as acetaminophen (Tylenol), ibuprofen (Advil, Motrin), or naproxen (Aleve). Read and follow all instructions on the label. · If the doctor prescribed antibiotics, take them as directed. Do not stop taking them just because you feel better. You need to take the full course of antibiotics. · Be careful when taking over-the-counter cold or flu medicines and Tylenol at the same time. Many of these medicines have acetaminophen, which is Tylenol. Read the labels to make sure that you are not taking more than the recommended dose. Too much acetaminophen (Tylenol) can be harmful. · Breathe warm, moist air from a steamy shower, a hot bath, or a sink filled with hot water. Avoid cold, dry air. Using a humidifier in your home may help. Follow the directions for cleaning the machine. · Use saline (saltwater) nasal washes to help keep your nasal passages open and wash out mucus and bacteria. You can buy saline nose drops at a grocery store or drugstore. Or you can make your own at home by adding 1 teaspoon of salt and 1 teaspoon of baking soda to 2 cups of distilled water. If you make your own, fill a bulb syringe with the solution, insert the tip into your nostril, and squeeze gently. Meier Ledesma your nose.   · Put a hot, wet towel or a warm gel pack on your face 3 or 4 times a day for 5 to 10 minutes each time. · Try a decongestant nasal spray like oxymetazoline (Afrin). Do not use it for more than 3 days in a row. Using it for more than 3 days can make your congestion worse. When should you call for help? Call your doctor now or seek immediate medical care if:    · You have new or worse swelling or redness in your face or around your eyes.     · You have a new or higher fever.    Watch closely for changes in your health, and be sure to contact your doctor if:    · You have new or worse facial pain.     · The mucus from your nose becomes thicker (like pus) or has new blood in it.     · You are not getting better as expected. Where can you learn more? Go to https://[employer]igerlily. org and sign in to your [employer] account. Enter T734 in the Onsite Care box to learn more about \"Sinusitis: Care Instructions. \"     If you do not have an account, please click on the \"Sign Up Now\" link. Current as of: July 28, 2019  Content Version: 12.3  © 3372-0954 Healthwise, [employer]. Care instructions adapted under license by [facility] ([facility]). If you have questions about a medical condition or this instruction, always ask your healthcare professional. [address] any warranty or liability for your use of this information. 1.) Take Augmentin as prescribed for sinus infection. Continue Tylenol/Motrin as needed. May use Flonase or nasal saline to help with congestion. Drink plenty of fluids.   2.)If symptoms worsen or do not improve please follow-up with PCP or return to clinic

## 2020-06-29 ENCOUNTER — OFFICE VISIT (OUTPATIENT)
Dept: OBGYN CLINIC | Age: 40
End: 2020-06-29
Payer: COMMERCIAL

## 2020-06-29 ENCOUNTER — HOSPITAL ENCOUNTER (OUTPATIENT)
Age: 40
Setting detail: SPECIMEN
Discharge: HOME | End: 2020-06-29
Payer: COMMERCIAL

## 2020-06-29 VITALS
SYSTOLIC BLOOD PRESSURE: 108 MMHG | DIASTOLIC BLOOD PRESSURE: 70 MMHG | BODY MASS INDEX: 24.92 KG/M2 | WEIGHT: 146 LBS | HEIGHT: 64 IN | TEMPERATURE: 97.7 F

## 2020-06-29 PROCEDURE — 88175 CYTOPATH C/V AUTO FLUID REDO: CPT

## 2020-06-29 PROCEDURE — 99395 PREV VISIT EST AGE 18-39: CPT | Performed by: NURSE PRACTITIONER

## 2020-06-29 PROCEDURE — 87624 HPV HI-RISK TYP POOLED RSLT: CPT

## 2020-06-29 ASSESSMENT — PATIENT HEALTH QUESTIONNAIRE - PHQ9
SUM OF ALL RESPONSES TO PHQ9 QUESTIONS 1 & 2: 0
SUM OF ALL RESPONSES TO PHQ QUESTIONS 1-9: 0
1. LITTLE INTEREST OR PLEASURE IN DOING THINGS: 0
SUM OF ALL RESPONSES TO PHQ QUESTIONS 1-9: 0
2. FEELING DOWN, DEPRESSED OR HOPELESS: 0

## 2020-06-29 NOTE — PROGRESS NOTES
History and Physical  [address], [address], [place] [address]. [phone]   Fax [phone]  Rey Reis  2020              44 y.o. Chief Complaint   Patient presents with    Gynecologic Exam       Patient's last menstrual period was 2020. Primary Care Physician: No primary care provider on file. The patient was seen and examined. She has no chief complaint today and is here for her annual exam.  Her bowels are regular. There are no voiding complaints. She denies any bloating. She denies vaginal discharge and was counseled on STD's and the need for barrier contraception.      HPI : Rey Reis is a 44 y.o. female     Annual exam  No chief complaint  ________________________________________________________________________  OB History    Para Term  AB Living   2 2 2 0 0 2   SAB TAB Ectopic Molar Multiple Live Births   0 0 0 0 0 2      # Outcome Date GA Lbr Matheus/2nd Weight Sex Delivery Anes PTL Lv   2 Term 09 39w5d  7 lb 10 oz (3.459 kg) M Vag-Spont   XIAO      Name: [place]: 8  Apgar5: 9   1 Term 06 39w0d  6 lb 4 oz (2.835 kg) F    XIAO      Name: Didier Ferrari      Past Medical History:   Diagnosis Date    Anxiety     Kidney stones     Vision loss of right eye                                                                    Past Surgical History:   Procedure Laterality Date    WISDOM TOOTH EXTRACTION       Family History   Problem Relation Age of Onset    Thyroid Disease Mother     High Blood Pressure Father     Diabetes Father     Migraines Father     High Cholesterol Father     Heart Disease Father     High Blood Pressure Brother     Cancer Maternal Grandmother         spreaded throughout her body    Cancer Maternal Grandfather         asbestis     Social History     Socioeconomic History    Marital status:      Spouse name: Not on file    Number of children: Not on appreciated. Location, grade, and radiation are not applicable. Extremities: The patients extremities were without calf tenderness, edema, or varicosities. There was full range of motion in all four extremities. Pulses in all four extremities were appreciated and are 2/4. Abdomen: The abdomen was soft and non-tender. There were good bowel sounds in all quadrants and there was no guarding, rebound or rigidity. On evaluation there was no evidence of hepatosplenomegaly and there was no costal vertebral ivette tenderness bilaterally. No hernias were appreciated. Abdominal Scars: intact    Psych: The patient had a normal Orientation to: Time, Place, Person, and Situation  There is no Mood / Affect changes    Breast:  (Chest)  normal appearance, no masses or tenderness  Self breast exams were reviewed in detail. Literature was given. Pelvic Exam:  Vulva and vagina appear normal. Bimanual exam reveals normal uterus and adnexa. Left slightly enlarged bartholin cyst noted- fluctuant. Denies pain with palpation. (patient reports has been this size for many years. Declines follow up). Rectal Exam:  exam declined by patient          Musculosk:  Normal Gait and station was noted. Digits were evaluated without abnormal findings. Range of motion, stability and strength were evaluated and found to be appropriate for the patients age. ASSESSMENT:      44 y.o. Annual   Diagnosis Orders   1. Visit for gynecologic examination  PAP SMEAR    YEN DIGITAL SCREEN W OR WO CAD BILATERAL    Comprehensive Metabolic Panel    CBC Auto Differential    TSH    Hemoglobin A1C    Lipid Panel   2. Screening for HPV (human papillomavirus)  PAP SMEAR   3.  Encounter for screening mammogram for malignant neoplasm of breast  YEN DIGITAL SCREEN W OR WO CAD BILATERAL          Chief Complaint   Patient presents with    Gynecologic Exam          Past Medical History:   Diagnosis Date    Anxiety     Kidney stones     Vision

## 2020-07-06 ENCOUNTER — TELEPHONE (OUTPATIENT)
Dept: OBGYN CLINIC | Age: 40
End: 2020-07-06

## 2020-07-06 LAB — CYTOLOGY REPORT: NORMAL

## 2020-07-06 NOTE — TELEPHONE ENCOUNTER
Attempted to notify patient of results and plan of care. Patient did not answer ,voicemail left, awaiting call back.

## 2020-07-06 NOTE — TELEPHONE ENCOUNTER
----- Message from AMADOU Ramirez NP sent at 7/6/2020  9:43 AM EDT -----  Pap smear ASCUS  HPV neg  Age 44  Repeat pap in 1 year

## 2020-07-06 NOTE — TELEPHONE ENCOUNTER
----- Message from AMADOU Mayo NP sent at 7/6/2020  9:43 AM EDT -----  Pap smear ASCUS  HPV neg  Age 44  Repeat pap in 1 year

## 2021-06-29 ENCOUNTER — HOSPITAL ENCOUNTER (OUTPATIENT)
Age: 41
Setting detail: SPECIMEN
Discharge: HOME | End: 2021-06-29
Payer: COMMERCIAL

## 2021-06-29 ENCOUNTER — OFFICE VISIT (OUTPATIENT)
Dept: OBGYN CLINIC | Age: 41
End: 2021-06-29
Payer: COMMERCIAL

## 2021-06-29 VITALS
HEIGHT: 64 IN | DIASTOLIC BLOOD PRESSURE: 80 MMHG | BODY MASS INDEX: 25.78 KG/M2 | HEART RATE: 78 BPM | WEIGHT: 151 LBS | SYSTOLIC BLOOD PRESSURE: 112 MMHG

## 2021-06-29 DIAGNOSIS — Z01.419 PAP SMEAR, AS PART OF ROUTINE GYNECOLOGICAL EXAMINATION: Primary | ICD-10-CM

## 2021-06-29 DIAGNOSIS — Z11.51 SCREENING FOR HUMAN PAPILLOMAVIRUS: ICD-10-CM

## 2021-06-29 DIAGNOSIS — Z01.419 PAP SMEAR, AS PART OF ROUTINE GYNECOLOGICAL EXAMINATION: ICD-10-CM

## 2021-06-29 DIAGNOSIS — Z12.31 ENCOUNTER FOR SCREENING MAMMOGRAM FOR BREAST CANCER: ICD-10-CM

## 2021-06-29 PROCEDURE — G0145 SCR C/V CYTO,THINLAYER,RESCR: HCPCS

## 2021-06-29 PROCEDURE — 99396 PREV VISIT EST AGE 40-64: CPT | Performed by: NURSE PRACTITIONER

## 2021-06-29 PROCEDURE — 87624 HPV HI-RISK TYP POOLED RSLT: CPT

## 2021-06-29 ASSESSMENT — PATIENT HEALTH QUESTIONNAIRE - PHQ9
2. FEELING DOWN, DEPRESSED OR HOPELESS: 0
1. LITTLE INTEREST OR PLEASURE IN DOING THINGS: 0
SUM OF ALL RESPONSES TO PHQ QUESTIONS 1-9: 0
SUM OF ALL RESPONSES TO PHQ9 QUESTIONS 1 & 2: 0

## 2021-06-29 NOTE — PROGRESS NOTES
History and Physical  [address], [address] [place] [address], [address] [facility] [phone]   Fax [phone]  Micheal Dc  2021              36 y.o. Chief Complaint   Patient presents with    Annual Exam       Patient's last menstrual period was 2021 (approximate). Primary Care Physician: No primary care provider on file. The patient was seen and examined. She has no chief complaint today and is here for her annual exam.  Her bowels are regular. There are no voiding complaints. She denies any bloating. She denies vaginal discharge and was counseled on STD's and the need for barrier contraception.      HPI : Michael Dc is a 36 y.o. female     Annual exam  No chief complaint  ________________________________________________________________________  OB History    Para Term  AB Living   2 2 2 0 0 2   SAB TAB Ectopic Molar Multiple Live Births   0 0 0 0 0 2      # Outcome Date GA Lbr Matheus/2nd Weight Sex Delivery Anes PTL Lv   2 Term 09 39w5d  7 lb 10 oz (3.459 kg) M Vag-Spont   XIAO      Name: Kody Bird: 8  Apgar5: 9   1 Term 06 39w0d  6 lb 4 oz (2.835 kg) F    XIAO      Name: Braxton Fritz      Past Medical History:   Diagnosis Date    Anxiety     Kidney stones     Vision loss of right eye                                                                    Past Surgical History:   Procedure Laterality Date    WISDOM TOOTH EXTRACTION       Family History   Problem Relation Age of Onset    Thyroid Disease Mother     High Blood Pressure Father     Diabetes Father     Migraines Father     High Cholesterol Father     Heart Disease Father     High Blood Pressure Brother     Cancer Maternal Grandmother         spreaded throughout her body    Cancer Maternal Grandfather         asbestis     Social History     Socioeconomic History    Marital status:      Spouse name: Not on file    Number of children: Not on file    Years of education: Not on file    Highest education level: Not on file   Occupational History    Not on file   Tobacco Use    Smoking status: Never Smoker    Smokeless tobacco: Never Used   Vaping Use    Vaping Use: Never used   Substance and Sexual Activity    Alcohol use: No    Drug use: No    Sexual activity: Yes     Partners: Male   Other Topics Concern    Not on file   Social History Narrative    Not on file     Social Determinants of Health     Financial Resource Strain:     Difficulty of Paying Living Expenses:    Food Insecurity:     Worried About Running Out of Food in the Last Year:     [address] in the Last Year:    Transportation Needs:     Lack of Transportation (Medical):  Lack of Transportation (Non-Medical):    Physical Activity:     Days of Exercise per Week:     Minutes of Exercise per Session:    Stress:     Feeling of Stress :    Social Connections:     Frequency of Communication with Friends and Family:     Frequency of Social Gatherings with Friends and Family:     Attends Baptism Services:     Active Member of Clubs or Organizations:     Attends Club or Organization Meetings:     Marital Status:    Intimate Partner Violence:     Fear of Current or Ex-Partner:     Emotionally Abused:     Physically Abused:     Sexually Abused:        MEDICATIONS:  Current Outpatient Medications   Medication Sig Dispense Refill    vitamin B-12 (CYANOCOBALAMIN) 1000 MCG tablet Take 3,000 mcg by mouth daily      vitamin C (ASCORBIC ACID) 500 MG tablet Take 500 mg by mouth daily      magnesium (MAGNESIUM-OXIDE) 250 MG TABS tablet Take 250 mg by mouth daily      Multiple Vitamin TABS Take 1 tablet by mouth. No current facility-administered medications for this visit.            ALLERGIES:  Allergies as of 06/29/2021 - Fully Reviewed 06/29/2021   Allergen Reaction Noted    Bactrim [sulfamethoxazole-trimethoprim] Rash 12/08/2016       Symptoms of decreased mood absent  Symptoms of anhedonia absent    **If either question is answered in a  positive fashion then complete the PHQ9 Scoring Evaluation and make the appropriate referral**      Immunization status: stated as current, but no records available. Gynecologic History:  Menarche: 15 yo  Menopause at [address] [place] yo     Patient's last menstrual period was 06/05/2021 (approximate). Sexually Active: Yes    STD History: No     Permanent Sterilization: No -  with vasectomy  Reversible Birth Control: No        Hormone Replacement Exposure: No      Genetic Qualified Family History of Breast, Ovarian , Colon or Uterine Cancer: No     If YES see scanned worksheet. Preventative Health Testing:    Health Maintenance:  Health Maintenance Due   Topic Date Due    Hepatitis C screen  Never done    Varicella vaccine (1 of 2 - 2-dose childhood series) Never done    COVID-19 Vaccine (1) Never done    HIV screen  Never done    DTaP/Tdap/Td vaccine (1 - Tdap) Never done    Diabetes screen  Never done       Date of Last Pap Smear: 6/29/2020 ASCUS/ neg  Abnormal Pap Smear History: denies  Colposcopy History:   Date of Last Mammogram: NA- will order one this year. Date of Last Colonoscopy:   Date of Last Bone Density:      ________________________________________________________________________        REVIEW OF SYSTEMS:    yes   A minimum of an eleven point review of systems was completed. Review Of Systems (11 point):  Constitutional: No fever, chills or malaise;  No weight change or fatigue  Head and Eyes: No vision, Headache, Dizziness or trauma in last 12 months  ENT ROS: No hearing, Tinnitis, sinus or taste problems  Hematological and Lymphatic ROS:No Lymphoma, Von Willebrand's, Hemophillia or Bleeding History  Psych ROS: No Depression, Homicidal thoughts,suicidal thoughts, or anxiety  Breast ROS: No prior breast abnormalities or lumps  Respiratory ROS: No SOB, Pneumoniae,Cough, or Pulmonary Embolism History  Cardiovascular ROS: No Chest Pain with Exertion, Palpitations, Syncope, Edema, Arrhythmia  Gastrointestinal ROS: No Indigestion, Heartburn, Nausea, vomiting, Diarrhea, Constipation,or Bowel Changes; No Bloody Stools or melena  Genito-Urinary ROS: No Dysuria, Hematuria or Nocturia. No Urinary Incontinence or Vaginal Discharge  Musculoskeletal ROS: No Arthralgia, Arthritis,Gout,Osteoporosis or Rheumatism  Neurological ROS: No CVA, Migraines, Epilepsy, Seizure Hx, or Limb Weakness  Dermatological ROS: No Rash, Itching, Hives, Mole Changes or Cancer                                                                                                                                                                                                                                  PHYSICAL Exam:     Constitutional:  Vitals:    06/29/21 0912   BP: 112/80   Site: Right Upper Arm   Position: Sitting   Cuff Size: Medium Adult   Pulse: 78   Weight: 151 lb (68.5 kg)   Height: 5' 4\" (1.626 m)       Chaperone for Intimate Exam   Chaperone was offered and accepted as part of the rooming process.  Chaperone: Charlotte          General Appearance: This  is a well Developed, well Nourished, well groomed female. Her BMI was reviewed. Nutritional decision making was discussed. Skin:  There was a Normal Inspection of the skin without rashes or lesions. There were no rashes. (Papular, Maculopapular, Hives, Pustular, Macular)     There were no lesions (Ulcers, Erythema, Abn. Appearing Nevi)            Lymphatic:  No Lymph Nodes were Palpable in the neck , axilla or groin.  0 # Of Lymph Nodes; Location ; Character [Normal]  [Shotty] [Tender] [Enlarged]     Neck and EENT:  The neck was supple. There were no masses   The thyroid was not enlarged and had no masses. Perrla, EOMI B/L, TMI B/L No Abnormalities. Throat inspected-No exudates or Masses, Nares Patent No Masses        Respiratory:   The lungs were auscultated and found recorded. OBGYN Status: Having periods  Past Surgical History:  1999: WISDOM TOOTH EXTRACTION      Social History    Tobacco Use      Smoking status: Never Smoker      Smokeless tobacco: Never Used       Standing Status:   Future     Number of Occurrences:   1     Standing Expiration Date:   6/28/2022     Order Specific Question:   Collection Type     Answer: Thin Prep     Order Specific Question:   Prior Abnormal Pap Test     Answer:   No     Order Specific Question:   Screening or Diagnostic     Answer:   Screening     Order Specific Question:   HPV Requested? Answer:   Yes     Order Specific Question:   High Risk Patient     Answer:   N/A           The patient, Tylor Acevedo is a 36 y.o. female, was seen with a total time spent of 20 minutes for the visit on this date of service by the E/M provider. The time component had both face to face and non face to face time spent in determining the total time component. Counseling and education regarding her diagnosis listed below and her options regarding those diagnoses were also included in determining her time component. Diagnosis Orders   1. Pap smear, as part of routine gynecological examination  PAP SMEAR   2. Encounter for screening mammogram for breast cancer  YEN DIGITAL SCREEN W OR WO CAD BILATERAL   3. Screening for human papillomavirus  PAP SMEAR        The patient had her preventative health maintenance recommendations and follow-up reviewed with her at the completion of her visit.

## 2021-07-01 ENCOUNTER — HOSPITAL ENCOUNTER (OUTPATIENT)
Dept: WOMENS IMAGING | Age: 41
Discharge: HOME | End: 2021-07-03
Payer: COMMERCIAL

## 2021-07-01 DIAGNOSIS — Z12.31 ENCOUNTER FOR SCREENING MAMMOGRAM FOR BREAST CANCER: ICD-10-CM

## 2021-07-01 PROCEDURE — 77063 BREAST TOMOSYNTHESIS BI: CPT

## 2021-07-06 LAB — CYTOLOGY REPORT: NORMAL

## 2022-07-12 ENCOUNTER — OFFICE VISIT (OUTPATIENT)
Dept: OBGYN CLINIC | Age: 42
End: 2022-07-12
Payer: COMMERCIAL

## 2022-07-12 ENCOUNTER — HOSPITAL ENCOUNTER (OUTPATIENT)
Age: 42
Setting detail: SPECIMEN
Discharge: HOME | End: 2022-07-12

## 2022-07-12 VITALS
SYSTOLIC BLOOD PRESSURE: 114 MMHG | HEIGHT: 64 IN | BODY MASS INDEX: 25.78 KG/M2 | DIASTOLIC BLOOD PRESSURE: 70 MMHG | WEIGHT: 151 LBS

## 2022-07-12 DIAGNOSIS — Z01.419 WELL FEMALE EXAM WITH ROUTINE GYNECOLOGICAL EXAM: Primary | ICD-10-CM

## 2022-07-12 DIAGNOSIS — K64.4 EXTERNAL HEMORRHOIDS: ICD-10-CM

## 2022-07-12 DIAGNOSIS — Z11.51 SPECIAL SCREENING EXAMINATION FOR HUMAN PAPILLOMAVIRUS (HPV): ICD-10-CM

## 2022-07-12 DIAGNOSIS — Z12.31 ENCOUNTER FOR SCREENING MAMMOGRAM FOR MALIGNANT NEOPLASM OF BREAST: ICD-10-CM

## 2022-07-12 DIAGNOSIS — N92.1 MENORRHAGIA WITH IRREGULAR CYCLE: ICD-10-CM

## 2022-07-12 PROBLEM — N20.0 NEPHROLITHIASIS: Status: ACTIVE | Noted: 2021-07-23

## 2022-07-12 PROCEDURE — 99396 PREV VISIT EST AGE 40-64: CPT | Performed by: NURSE PRACTITIONER

## 2022-07-12 RX ORDER — CETIRIZINE HYDROCHLORIDE 10 MG/1
10 TABLET ORAL DAILY
COMMUNITY

## 2022-07-12 ASSESSMENT — PATIENT HEALTH QUESTIONNAIRE - PHQ9
SUM OF ALL RESPONSES TO PHQ QUESTIONS 1-9: 0
SUM OF ALL RESPONSES TO PHQ QUESTIONS 1-9: 0
1. LITTLE INTEREST OR PLEASURE IN DOING THINGS: 0
SUM OF ALL RESPONSES TO PHQ9 QUESTIONS 1 & 2: 0
2. FEELING DOWN, DEPRESSED OR HOPELESS: 0
SUM OF ALL RESPONSES TO PHQ QUESTIONS 1-9: 0
SUM OF ALL RESPONSES TO PHQ QUESTIONS 1-9: 0

## 2022-07-12 NOTE — PROGRESS NOTES
History and Physical  [address], [address] [place] [address], [address] [facility] [phone]   Fax [phone]  Marylee First  2022              39 y.o. Chief Complaint   Patient presents with    Annual Exam       Patient's last menstrual period was 2022. Primary Care Physician: No primary care provider on file. The patient was seen and examined. She has no chief complaint today and is here for her annual exam.  Her bowels are regular. There are no voiding complaints. She denies any bloating. She denies vaginal discharge and was counseled on STD's and the need for barrier contraception. HPI : Marylee First is a 39 y.o. female     Annual exam  Complaining of hemorrhoids. Has had them since birth of her son . Has not noticed them change in size. Complaining of bleeding from rectum 3-4 days prior to period. Complaining of irregular menses. Periods occur 4-7 weeks, lasting 4-5 days long. First 2 days are heaviest.  Has to change tampon every 1 hour, passing large clots. Last ultrasound was in - small uterine fibroid noted.  with vasectomy.     ________________________________________________________________________  OB History    Para Term  AB Living   2 2 2 0 0 2   SAB IAB Ectopic Molar Multiple Live Births   0 0 0 0 0 2      # Outcome Date GA Lbr Matheus/2nd Weight Sex Delivery Anes PTL Lv   2 Term 09 39w5d  7 lb 10 oz (3.459 kg) M Vag-Spont   XIAO      Name: Lolis Labrum: 8  Apgar5: 9   1 Term 06 39w0d  6 lb 4 oz (2.835 kg) F    XIAO      Name: Natan Palm      Past Medical History:   Diagnosis Date    Anxiety     Kidney stones     Nephrolithiasis 2021    Vision loss of right eye                                                                    Past Surgical History:   Procedure Laterality Date    WISDOM TOOTH EXTRACTION       Family History   Problem Relation Age of Onset    Thyroid Disease Mother     High Blood Pressure Father     Diabetes Father     Migraines Father     High Cholesterol Father     Heart Disease Father     High Blood Pressure Brother     Cancer Maternal Grandmother         spreaded throughout her body    Cancer Maternal Grandfather         asbestis     Social History     Socioeconomic History    Marital status:      Spouse name: Not on file    Number of children: Not on file    Years of education: Not on file    Highest education level: Not on file   Occupational History    Not on file   Tobacco Use    Smoking status: Never Smoker    Smokeless tobacco: Never Used   Vaping Use    Vaping Use: Never used   Substance and Sexual Activity    Alcohol use: No    Drug use: No    Sexual activity: Yes     Partners: Male   Other Topics Concern    Not on file   Social History Narrative    Not on file     Social Determinants of Health     Financial Resource Strain:     Difficulty of Paying Living Expenses: Not on file   Food Insecurity:     Worried About Running Out of Food in the Last Year: Not on file    Krystina of Food in the Last Year: Not on file   Transportation Needs:     Lack of Transportation (Medical): Not on file    Lack of Transportation (Non-Medical):  Not on file   Physical Activity:     Days of Exercise per Week: Not on file    Minutes of Exercise per Session: Not on file   Stress:     Feeling of Stress : Not on file   Social Connections:     Frequency of Communication with Friends and Family: Not on file    Frequency of Social Gatherings with Friends and Family: Not on file    Attends Adventism Services: Not on file    Active Member of Clubs or Organizations: Not on file    Attends Club or Organization Meetings: Not on file    Marital Status: Not on file   Intimate Partner Violence:     Fear of Current or Ex-Partner: Not on file    Emotionally Abused: Not on file    Physically Abused: Not on file    Sexually Abused: Not on file History: hx of ASCUS- no HPV  Colposcopy History:   Date of Last Mammogram: 6/29/2021 negative  Date of Last Colonoscopy:   Date of Last Bone Density:      ________________________________________________________________________        REVIEW OF SYSTEMS:    yes   A minimum of an eleven point review of systems was completed. Review Of Systems (11 point):  Constitutional: No fever, chills or malaise; No weight change or fatigue  Head and Eyes: No vision changes, Headache, Dizziness or trauma in last 12 months  ENT ROS: No hearing, Tinnitis, sinus or taste problems  Hematological and Lymphatic ROS:No Lymphoma, Von Willebrand's, Hemophillia or Bleeding History  Psych ROS: No Depression, Homicidal thoughts,suicidal thoughts, or anxiety  Breast ROS: No breast abnormalities or lumps  Respiratory ROS: No SOB, Pneumoniae,Cough, or Pulmonary Embolism   Cardiovascular ROS: No Chest Pain with Exertion, Palpitations, Syncope, Edema, Arrhythmia  Gastrointestinal ROS: No Indigestion, Heartburn, Nausea, vomiting, Diarrhea, Constipation,or Bowel Changes; No Bloody Stools or melena  Genito-Urinary ROS: No Dysuria, Hematuria or Nocturia. No Urinary Incontinence or Vaginal Discharge.  Irregular menses  Musculoskeletal ROS: No Arthralgia, Arthritis,Gout,Osteoporosis or Rheumatism  Neurological ROS: No CVA, Migraines, Epilepsy, Seizure Hx, or Limb Weakness  Dermatological ROS: No Rash, Itching, Hives, Mole Changes or Cancer                                                                                                                                                                                                                                  PHYSICAL Exam:     Constitutional:  Vitals:    07/12/22 1145   BP: 114/70   Site: Right Upper Arm   Position: Sitting   Cuff Size: Medium Adult   Weight: 151 lb (68.5 kg)   Height: 5' 4\" (1.626 m)       Chaperone for Intimate Exam   Chaperone was offered and accepted as part of the rooming process.  Chaperone: Ambika          General Appearance: This  is a well Developed, well Nourished, well groomed female. Her BMI was reviewed. Nutritional decision making was discussed. Skin:  There was a Normal Inspection of the skin without rashes or lesions. There were no rashes. (Papular, Maculopapular, Hives, Pustular, Macular)     There were no lesions (Ulcers, Erythema, Abn. Appearing Nevi)            Lymphatic:  No Lymph Nodes were Palpable in the neck , axilla or groin.  0 # Of Lymph Nodes; Location ; Character [Normal]  [Shotty] [Tender] [Enlarged]     Neck and EENT:  The neck was supple. There were no masses   The thyroid was not enlarged and had no masses. Perrla, EOMI B/L, TMI B/L No Abnormalities. Throat inspected-No exudates or Masses, Nares Patent No Masses        Respiratory: The lungs were auscultated and found to be clear. There were no rales, rhonchi or wheezes. There was a good respiratory effort. Cardiovascular: The heart was in a regular rate and rhythm. . No S3 or S4. There was no murmur appreciated. Location, grade, and radiation are not applicable. Extremities: The patients extremities were without calf tenderness, edema, or varicosities. There was full range of motion in all four extremities. Pulses in all four extremities were appreciated and are 2/4. Abdomen: The abdomen was soft and non-tender. There were good bowel sounds in all quadrants and there was no guarding, rebound or rigidity. On evaluation there was no evidence of hepatosplenomegaly and there was no costal vertebral ivette tenderness bilaterally. No hernias were appreciated. Abdominal Scars: intact    Psych:   The patient had a normal Orientation to: Time, Place, Person, and Situation  There is no Mood / Affect changes    Breast:  (Chest)  normal appearance, no masses or tenderness, No nipple retraction or dimpling, No nipple discharge or bleeding, No axillary or supraclavicular adenopathy, Normal to palpation without dominant masses  Self breast exams were reviewed in detail. Literature was given. Pelvic Exam:  External genitalia: normal general appearance  Urinary system: urethral meatus normal  Vaginal: normal mucosa without prolapse or lesions  Cervix: normal appearance  Adnexa: normal bimanual exam  Uterus: normal single, nontender    Rectal Exam:  external hemorrhoids, small- nonthrombosed. Musculosk:  Normal Gait and station was noted. Digits were evaluated without abnormal findings. Range of motion, stability and strength were evaluated and found to be appropriate for the patients age. ASSESSMENT:      39 y.o. Annual   Diagnosis Orders   1. Well female exam with routine gynecological exam  PAP SMEAR   2. Encounter for screening mammogram for malignant neoplasm of breast  YEN DIGITAL SCREEN W OR WO CAD BILATERAL   3. Special screening examination for human papillomavirus (HPV)  PAP SMEAR   4. Menorrhagia with irregular cycle  US NON OB TRANSVAGINAL    US PELVIS COMPLETE    CBC with Auto Differential    TSH with Reflex    HCG, Quantitative, Pregnancy    Protime-INR    APTT    Hemoglobin A1C   5. External hemorrhoids            Chief Complaint   Patient presents with    Annual Exam          Past Medical History:   Diagnosis Date    Anxiety     Kidney stones     Nephrolithiasis 7/23/2021    Vision loss of right eye          Patient Active Problem List    Diagnosis Date Noted    Nephrolithiasis 07/23/2021     Priority: Medium    Anxiety     Sudden visual loss 11/06/2013    Acute retrobulbar neuritis 11/06/2013    Regular astigmatism 07/17/2013    Myopia 07/17/2013          Hereditary Breast, Ovarian, Colon and Uterine Cancer screening Done. Tobacco & Secondary smoke risks reviewed; instructed on cessation and avoidance      Counseling Completed:  Preventative Health Recommendations and Follow up.   The patient was informed of the recommended preventative health recommendations. 1. Annuals every year; Cytology collections per prevailing guidelines. 2. Mammograms begin every year at 37 yo if no abnormalities are found and no family history. 3. Bone density studies every 2-3 years. Begin at 73 yo. If no fracture history or osteoporosis family history. (significant). 4. Colonoscopy begin at 40 yo. Repeat every ten years if negative and no family history. 5. Calcium of 3676-3064 mg/day in split dosing  6. Vitamin D 400-800 IU/day  7. All other preventative health recommendations will be managed by the patients Primary care physician. PLAN:  Return in about 4 weeks (around 8/9/2022) for heavy menses/ physician. Pelvic ultrasound ordered. Screening mammogram ordered. Pap smear obtained. Referral to GI- hemorrhoids, rectal bleeding. Repeat Annual every 1 year  Cervical Cytology Evaluation begins at 24years old. If Negative Cytology, Follow-up screening per current guidelines. Mammograms every 1 year. If 37 yo and last mammogram was negative. Calcium and Vitamin D dosing reviewed. Colonoscopy screening reviewed as well as onset for bone density testing. Birth control and barrier recommendations discussed. STD counseling and prevention reviewed. Gardisil counseling completed for all patients 10-37 yo. Routine health maintenance per patients PCP.   Orders Placed This Encounter   Procedures    YEN DIGITAL SCREEN W OR WO CAD BILATERAL     Standing Status:   Future     Standing Expiration Date:   9/11/2023     Order Specific Question:   Reason for exam:     Answer:   screening mammogram    US NON OB TRANSVAGINAL     Standing Status:   Future     Standing Expiration Date:   1/12/2023     Order Specific Question:   Reason for exam:     Answer:   irregular menses    US PELVIS COMPLETE     Standing Status:   Future     Standing Expiration Date:   10/12/2022     Order Specific Question:   Reason for exam:     Answer:   irregular menses    PAP SMEAR Patient History:    No LMP recorded. OBGYN Status: Having periods  Past Surgical History:  1999: WISDOM TOOTH EXTRACTION      Social History    Tobacco Use      Smoking status: Never Smoker      Smokeless tobacco: Never Used       Standing Status:   Future     Standing Expiration Date:   7/11/2023     Order Specific Question:   Collection Type     Answer: Thin Prep     Order Specific Question:   Prior Abnormal Pap Test     Answer:   Yes     Order Specific Question:   If Prior Abnormal, Give Date     Answer:   6/29/21     Order Specific Question:   Prior Treatment     Answer:   None Given     Order Specific Question:   Screening or Diagnostic     Answer:   Screening     Order Specific Question:   HPV Requested? Answer:   Yes     Order Specific Question:   High Risk Patient     Answer:   N/A    CBC with Auto Differential     Standing Status:   Future     Standing Expiration Date:   7/12/2023    TSH with Reflex     Standing Status:   Future     Standing Expiration Date:   7/12/2023    HCG, Quantitative, Pregnancy     Standing Status:   Future     Standing Expiration Date:   7/12/2023    Protime-INR     Standing Status:   Future     Standing Expiration Date:   7/12/2023     Order Specific Question:   Daily Coumadin Dose? Answer:   N    APTT     Standing Status:   Future     Standing Expiration Date:   7/12/2023     Order Specific Question:   Daily Heparin Dose? Answer:   N    Hemoglobin A1C     Standing Status:   Future     Standing Expiration Date:   7/12/2023           The patient, Kenzie Duarte is a 39 y.o. female, was seen with a total time spent of 20 minutes for the visit on this date of service by the E/M provider. The time component had both face to face and non face to face time spent in determining the total time component. Counseling and education regarding her diagnosis listed below and her options regarding those diagnoses were also included in determining her time component. Diagnosis Orders   1. Well female exam with routine gynecological exam  PAP SMEAR   2. Encounter for screening mammogram for malignant neoplasm of breast  YEN DIGITAL SCREEN W OR WO CAD BILATERAL   3. Special screening examination for human papillomavirus (HPV)  PAP SMEAR   4. Menorrhagia with irregular cycle  US NON OB TRANSVAGINAL    US PELVIS COMPLETE    CBC with Auto Differential    TSH with Reflex    HCG, Quantitative, Pregnancy    Protime-INR    APTT    Hemoglobin A1C   5. External hemorrhoids          The patient had her preventative health maintenance recommendations and follow-up reviewed with her at the completion of her visit.

## 2022-07-19 LAB — CYTOLOGY REPORT: NORMAL

## 2022-07-26 DIAGNOSIS — N92.1 MENORRHAGIA WITH IRREGULAR CYCLE: ICD-10-CM

## 2022-08-02 ENCOUNTER — OFFICE VISIT (OUTPATIENT)
Dept: OBGYN CLINIC | Age: 42
End: 2022-08-02
Payer: COMMERCIAL

## 2022-08-02 VITALS
HEIGHT: 64 IN | DIASTOLIC BLOOD PRESSURE: 64 MMHG | WEIGHT: 152 LBS | SYSTOLIC BLOOD PRESSURE: 114 MMHG | BODY MASS INDEX: 25.95 KG/M2

## 2022-08-02 DIAGNOSIS — K64.4 EXTERNAL HEMORRHOIDS: ICD-10-CM

## 2022-08-02 DIAGNOSIS — D25.9 UTERINE LEIOMYOMA, UNSPECIFIED LOCATION: ICD-10-CM

## 2022-08-02 DIAGNOSIS — R93.89 ENDOMETRIAL THICKENING ON ULTRASOUND: ICD-10-CM

## 2022-08-02 DIAGNOSIS — N92.1 MENORRHAGIA WITH IRREGULAR CYCLE: Primary | ICD-10-CM

## 2022-08-02 DIAGNOSIS — N92.1 MENORRHAGIA WITH IRREGULAR CYCLE: ICD-10-CM

## 2022-08-02 PROCEDURE — 76856 US EXAM PELVIC COMPLETE: CPT | Performed by: OBSTETRICS & GYNECOLOGY

## 2022-08-02 PROCEDURE — 99213 OFFICE O/P EST LOW 20 MIN: CPT | Performed by: OBSTETRICS & GYNECOLOGY

## 2022-08-02 PROCEDURE — 76830 TRANSVAGINAL US NON-OB: CPT | Performed by: OBSTETRICS & GYNECOLOGY

## 2022-08-02 PROCEDURE — G8427 DOCREV CUR MEDS BY ELIG CLIN: HCPCS | Performed by: OBSTETRICS & GYNECOLOGY

## 2022-08-02 PROCEDURE — G8419 CALC BMI OUT NRM PARAM NOF/U: HCPCS | Performed by: OBSTETRICS & GYNECOLOGY

## 2022-08-02 PROCEDURE — 1036F TOBACCO NON-USER: CPT | Performed by: OBSTETRICS & GYNECOLOGY

## 2022-08-02 NOTE — PROGRESS NOTES
Kira Robert  2022    YOB: 1980          The patient was seen today. She is here regarding follow up on heavy menses. Pt states she bleeds heavy x 2-3 days with large clots. Pt soaks through super tampon every 45 minutes. Pt has completed child bearing and her  had a vasectomy. Pt was counseled on options of mirena IUD/ novasure endometrial ablation/ micronor vs definitive surgery. Sono with 3 cm fibroid essentially unchanged from 2014 and thickened endometrial lining. Pt given information on options. Pt to RTO for endometrial bioopsy/ office hysteroscopy . Her bowels are regular and she is voiding without difficulty.      HPI:  Kira Robert is a 39 y.o. female        OB History    Para Term  AB Living   2 2 2 0 0 2   SAB IAB Ectopic Molar Multiple Live Births   0 0 0 0 0 2      # Outcome Date GA Lbr Matheus/2nd Weight Sex Delivery Anes PTL Lv   2 Term 09 39w5d  7 lb 10 oz (3.459 kg) M Vag-Spont   XIAO      Name: Rea Jetty: 8  Apgar5: 9   1 Term 06 39w0d  6 lb 4 oz (2.835 kg) F    XIAO      Name: Adrianna Hernandez        Past Medical History:   Diagnosis Date    Anxiety     Kidney stones     Nephrolithiasis 2021    Vision loss of right eye        Past Surgical History:   Procedure Laterality Date    WISDOM TOOTH EXTRACTION         Family History   Problem Relation Age of Onset    Thyroid Disease Mother     High Blood Pressure Father     Diabetes Father     Migraines Father     High Cholesterol Father     Heart Disease Father     High Blood Pressure Brother     Cancer Maternal Grandmother         spreaded throughout her body    Cancer Maternal Grandfather         asbestis       Social History     Socioeconomic History    Marital status:      Spouse name: Not on file    Number of children: Not on file    Years of education: Not on file    Highest education level: Not on file   Occupational History    Not on file   Tobacco Use    Smoking status: Never    Smokeless tobacco: Never   Vaping Use    Vaping Use: Never used   Substance and Sexual Activity    Alcohol use: No    Drug use: No    Sexual activity: Yes     Partners: Male   Other Topics Concern    Not on file   Social History Narrative    Not on file     Social Determinants of Health     Financial Resource Strain: Not on file   Food Insecurity: Not on file   Transportation Needs: Not on file   Physical Activity: Not on file   Stress: Not on file   Social Connections: Not on file   Intimate Partner Violence: Not on file   Housing Stability: Not on file         MEDICATIONS:  Current Outpatient Medications   Medication Sig Dispense Refill    cetirizine (ZYRTEC) 10 MG tablet Take 10 mg by mouth daily      vitamin B-12 (CYANOCOBALAMIN) 1000 MCG tablet Take 3,000 mcg by mouth daily      vitamin C (ASCORBIC ACID) 500 MG tablet Take 500 mg by mouth daily      magnesium (MAGNESIUM-OXIDE) 250 MG TABS tablet Take 250 mg by mouth daily      Multiple Vitamin TABS Take 1 tablet by mouth. No current facility-administered medications for this visit. ALLERGIES:  Allergies as of 08/02/2022 - Fully Reviewed 08/02/2022   Allergen Reaction Noted    Bactrim [sulfamethoxazole-trimethoprim] Rash 12/08/2016         REVIEW OF SYSTEMS:       A minimum of an eleven point review of systems was completed. Review Of Systems (11 point):  Constitutional: No fever, chills or malaise;  No weight change or fatigue  Head and Eyes: No vision, Headache, Dizziness or trauma in last 12 months  ENT ROS: No hearing, Tinnitis, sinus or taste problems  Hematological and Lymphatic ROS:No Lymphoma, Von Willebrand's, Hemophillia or Bleeding History  Psych ROS: No Depression, Homicidal thoughts,suicidal thoughts, or anxiety  Breast ROS: No prior breast abnormalities or lumps  Respiratory ROS: No SOB, Pneumoniae,Cough, or Pulmonary Embolism History  Cardiovascular ROS: No Chest Pain with Exertion, Palpitations, Syncope, Edema, Arrhythmia  Gastrointestinal ROS: No Indigestion, Heartburn, Nausea, vomiting, Diarrhea, Constipation,or Bowel Changes; No Bloody Stools or melena  Genito-Urinary ROS: No Dysuria, Hematuria or Nocturia. No Urinary Incontinence or Vaginal Discharge  Musculoskeletal ROS: No Arthralgia, Arthritis,Gout,Osteoporosis or Rheumatism  Neurological ROS: No CVA, Migraines, Epilepsy, Seizure Hx, or Limb Weakness  Dermatological ROS: No Rash, Itching, Hives, Mole Changes or Cancer          Blood pressure 114/64, height 5' 4\" (1.626 m), weight 152 lb (68.9 kg), last menstrual period 07/29/2022, not currently breastfeeding. Abdomen: Soft non-tender; good bowel sounds. No guarding, rebound or rigidity. No CVA tenderness bilaterally. Extremities: No calf tenderness, DTR 2/4, and No edema bilaterally    Pelvic: Exam deferred. Diagnostics:  US PELVIS COMPLETE    Result Date: 8/2/2022  See transvaginal ultrasound report from same day  Uterus7.96 x 5.89 x 5.70 cm  Endometrium 1.86 cm  Ovaries wnl  Fibroid left fundal 3.22 x 3.06 x 2.60 cm      Lab Results:  Results for orders placed or performed during the hospital encounter of 07/12/22   GYN Cytology   Result Value Ref Range    Cytology Report       INTERPRETATION    Cervical material, (ThinPrep vial, Imaging-assisted review):  Specimen Adequacy:       Satisfactory for evaluation.       -Endocervical/transformation zone component is absent. Descriptive Diagnosis:       Negative for intraepithelial lesion or malignancy.           Cytotechnologist:   Rony MCKEON(ASCP)  **Electronically Signed Out**  ey/7/19/2022        Procedure/Addendum  HPV Procedure Report       Date Ordered:     7/13/2022     Status: Signed Out       Date Complete:     7/15/2022     By: System Interface       Date Reported:     7/15/2022              Sample:  HPV Type 16      Result:   Not Detected      Ref Range:  (Not Detected)  Sample:  HPV Type 18      Result:   Not Detected      Ref Range: (Not  Detected)  Sample: Other High Risk HPV      Result:   Not Detected      Ref  Range: (Not Detected)  Sample:  HPV Interp      Result:         Ref Range: (Not Detected)  This test amplifies and detects DNA of 14 high-risk HPV types  associated with cervical cance r and its precursor lesions   (HPV types 16,18, 31, 33, 35, 39, 45, 51, 52, 56, 58, 59, 66, and  68). Sensitivity may be affected by specimen collection methods, stage of  infection, and the presence of interfering   substances. Results should be interpreted in conjunction with other  available laboratory and clinical data. A negative   high-risk HPV result does not exclude the possibility of future  cytologic HSIL or underlying CIN2-3 or cancer. This test is intended for medical purposes only and is not valid for  the evaluation of suspected sexual abuse or for   other forensic purposes. Source:  A: Cervical material, (ThinPrep vial, Imaging-assisted review)    Clinical History  Z01.419 Routine gyn exam without abnormal findings  Z11.51 Encounter for screening for HPV  Prior abnormal pap: 6/29/21  Co-Test:  ThinPrep Pap with high risk HPV testing    GYNECOLOGIC CYTOLOGY REPORT    Patient Name: Isabella Keys: [id number]  Path Number: [id number]  [address] [address], [facility] [address]. [facility], [address]  [phone]  Fax: [phone]     Human papillomavirus (HPV) DNA probe thin prep high risk   Result Value Ref Range    Specimen Description . GENITAL - NOT SPECIFIED     HPV Sample . THIN PREP     HPV, Genotype 16 Not Detected Not Detected    HPV, Genotype 18 Not Detected Not Detected    HPV, High Risk Other Not Detected Not Detected    HPV, Interpretation               Assessment:   Diagnosis Orders   1. Menorrhagia with irregular cycle        2. External hemorrhoids        3. Uterine leiomyoma, unspecified location        4.  Endometrial thickening

## 2022-08-11 ENCOUNTER — HOSPITAL ENCOUNTER (OUTPATIENT)
Dept: WOMENS IMAGING | Age: 42
Discharge: HOME | End: 2022-08-13
Payer: COMMERCIAL

## 2022-08-11 DIAGNOSIS — Z12.31 ENCOUNTER FOR SCREENING MAMMOGRAM FOR MALIGNANT NEOPLASM OF BREAST: ICD-10-CM

## 2022-08-11 PROCEDURE — 77063 BREAST TOMOSYNTHESIS BI: CPT

## 2022-12-20 ENCOUNTER — TELEPHONE (OUTPATIENT)
Dept: OBGYN CLINIC | Age: 42
End: 2022-12-20

## 2023-07-12 ENCOUNTER — OFFICE VISIT (OUTPATIENT)
Dept: OBGYN CLINIC | Age: 43
End: 2023-07-12
Payer: COMMERCIAL

## 2023-07-12 ENCOUNTER — HOSPITAL ENCOUNTER (OUTPATIENT)
Age: 43
Setting detail: SPECIMEN
Discharge: HOME | End: 2023-07-12

## 2023-07-12 VITALS
WEIGHT: 156 LBS | DIASTOLIC BLOOD PRESSURE: 80 MMHG | HEIGHT: 64 IN | SYSTOLIC BLOOD PRESSURE: 120 MMHG | BODY MASS INDEX: 26.63 KG/M2

## 2023-07-12 DIAGNOSIS — R93.5 ABNORMAL ULTRASOUND OF UTERUS: ICD-10-CM

## 2023-07-12 DIAGNOSIS — Z01.419 WELL FEMALE EXAM WITH ROUTINE GYNECOLOGICAL EXAM: Primary | ICD-10-CM

## 2023-07-12 DIAGNOSIS — Z12.31 ENCOUNTER FOR SCREENING MAMMOGRAM FOR MALIGNANT NEOPLASM OF BREAST: ICD-10-CM

## 2023-07-12 DIAGNOSIS — Z11.51 SPECIAL SCREENING EXAMINATION FOR HUMAN PAPILLOMAVIRUS (HPV): ICD-10-CM

## 2023-07-12 DIAGNOSIS — R93.89 INCREASED ENDOMETRIAL STRIPE: ICD-10-CM

## 2023-07-12 PROCEDURE — 99396 PREV VISIT EST AGE 40-64: CPT | Performed by: NURSE PRACTITIONER

## 2023-07-12 ASSESSMENT — PATIENT HEALTH QUESTIONNAIRE - PHQ9
SUM OF ALL RESPONSES TO PHQ QUESTIONS 1-9: 0
1. LITTLE INTEREST OR PLEASURE IN DOING THINGS: 0
SUM OF ALL RESPONSES TO PHQ9 QUESTIONS 1 & 2: 0
SUM OF ALL RESPONSES TO PHQ QUESTIONS 1-9: 0
2. FEELING DOWN, DEPRESSED OR HOPELESS: 0
SUM OF ALL RESPONSES TO PHQ QUESTIONS 1-9: 0
SUM OF ALL RESPONSES TO PHQ QUESTIONS 1-9: 0

## 2023-07-12 NOTE — PROGRESS NOTES
History and Physical  [address] [facility], [address] [facility] [phone]   Fax [phone]  Kar Form  2023              43 y.o. Chief Complaint   Patient presents with    Annual Exam       Patient's last menstrual period was 2023 (exact date). Primary Care Physician: No primary care provider on file. The patient was seen and examined. She has no chief complaint today and is here for her annual exam.  Her bowels are regular. There are no voiding complaints. She denies any bloating. She denies vaginal discharge and was counseled on STD's and the need for barrier contraception. HPI : Kar Form is a 43 y.o. female     Annual exam  No chief complaint. Hx of thickened endometrial lining on pelvic ultrasound 2023. Did not have endometrial biopsy. Periods occur every 4-5 weeks, lasting 4-6 days. First couple of days are heavy. Previously saw DR Keegan Leonardo last August to discuss options. Periods are not as bad if she takes motrin first couple of days of period. Declines follow up with physician unless abnormal ultrasound.   ___________________________________________________________________  OB History    Para Term  AB Living   2 2 2 0 0 2   SAB IAB Ectopic Molar Multiple Live Births   0 0 0 0 0 2      # Outcome Date GA Lbr Matheus/2nd Weight Sex Delivery Anes PTL Lv   2 Term 09 39w5d  7 lb 10 oz (3.459 kg) M Vag-Spont   XIAO      Name: Supriya Ear: 8  Apgar5: 9   1 Term 06 39w0d  6 lb 4 oz (2.835 kg) F    XIAO      Name: Keyanna Giraldo      Past Medical History:   Diagnosis Date    Anxiety     Kidney stones     Nephrolithiasis 2021    Vision loss of right eye                                                                    Past Surgical History:   Procedure Laterality Date    WISDOM TOOTH EXTRACTION       Family History   Problem Relation Age of Onset    Thyroid Disease Mother     High

## 2023-07-14 LAB
HPV I/H RISK 4 DNA CVX QL NAA+PROBE: NOT DETECTED
HPV SAMPLE: NORMAL
HPV, INTERPRETATION: NORMAL
HPV16 DNA CVX QL NAA+PROBE: NOT DETECTED
HPV18 DNA CVX QL NAA+PROBE: NOT DETECTED
SPECIMEN DESCRIPTION: NORMAL

## 2023-07-27 LAB — CYTOLOGY REPORT: NORMAL

## 2023-07-31 ENCOUNTER — PROCEDURE VISIT (OUTPATIENT)
Dept: OBGYN CLINIC | Age: 43
End: 2023-07-31

## 2023-07-31 DIAGNOSIS — R93.89 INCREASED ENDOMETRIAL STRIPE: ICD-10-CM

## 2023-07-31 DIAGNOSIS — R93.5 ABNORMAL ULTRASOUND OF UTERUS: ICD-10-CM

## 2023-08-16 ENCOUNTER — HOSPITAL ENCOUNTER (OUTPATIENT)
Dept: WOMENS IMAGING | Age: 43
Discharge: HOME | End: 2023-08-18
Payer: COMMERCIAL

## 2023-08-16 DIAGNOSIS — Z12.31 ENCOUNTER FOR SCREENING MAMMOGRAM FOR MALIGNANT NEOPLASM OF BREAST: ICD-10-CM

## 2023-08-16 DIAGNOSIS — Z01.419 WELL FEMALE EXAM WITH ROUTINE GYNECOLOGICAL EXAM: ICD-10-CM

## 2023-08-16 PROCEDURE — 77063 BREAST TOMOSYNTHESIS BI: CPT

## 2023-09-15 ENCOUNTER — PROCEDURE VISIT (OUTPATIENT)
Dept: OBGYN CLINIC | Age: 43
End: 2023-09-15

## 2023-09-15 ENCOUNTER — HOSPITAL ENCOUNTER (OUTPATIENT)
Age: 43
Setting detail: SPECIMEN
Discharge: HOME | End: 2023-09-15

## 2023-09-15 VITALS
WEIGHT: 157 LBS | DIASTOLIC BLOOD PRESSURE: 82 MMHG | BODY MASS INDEX: 26.8 KG/M2 | HEIGHT: 64 IN | SYSTOLIC BLOOD PRESSURE: 130 MMHG

## 2023-09-15 DIAGNOSIS — D25.9 UTERINE LEIOMYOMA, UNSPECIFIED LOCATION: ICD-10-CM

## 2023-09-15 DIAGNOSIS — R93.89 ENDOMETRIAL THICKENING ON ULTRASOUND: ICD-10-CM

## 2023-09-15 DIAGNOSIS — N92.1 MENORRHAGIA WITH IRREGULAR CYCLE: Primary | ICD-10-CM

## 2023-09-15 LAB
CONTROL: NORMAL
PREGNANCY TEST URINE, POC: NEGATIVE

## 2023-09-15 NOTE — PROGRESS NOTES
Ki Motley DO on 7/31/23 at 3:28 PM EDT     LABS:  UPT: negative    No visits with results within 6 Week(s) from this visit. Latest known visit with results is:   Hospital Outpatient Visit on 07/12/2023   Component Date Value Ref Range Status    Specimen Description 07/12/2023 CERVICAL MATERIAL   Final    HPV Sample 07/12/2023 . THIN PREP   Final    HPV, Genotype 16 07/12/2023 Not Detected  Not Detected Final    HPV, Genotype 18 07/12/2023 Not Detected  Not Detected Final    HPV, High Risk Other 07/12/2023 Not Detected  Not Detected Final    HPV, Interpretation 07/12/2023        Final    Comment: This test amplifies and detects DNA of 14 high-risk HPV types associated with cervical   cancer and its precursor lesions (HPV types 16,18, 31, 33, 35, 39, 45, 51, 52, 56, 58, 59,   66, and 68). Sensitivity may be affected by specimen collection methods, stage of infection, and the   presence of interfering substances. Results should be interpreted in conjunction with other available laboratory and clinical   data. A negative high-risk HPV result does not exclude the possibility of future cytologic HSIL or   underlying CIN2-3 or cancer. This test is intended for medical purposes only and is not valid for the evaluation of   suspected sexual abuse or for other forensic purposes. Cytology Report 07/12/2023    Final                    Value:Path Number: [id number]    DIAGNOSIS    Imaged ThinPrep Pap - Cervical (1 monolayer slide):  Specimen Adequacy:       Satisfactory for evaluation.       - Endocervical/transformation zone component present. Descriptive Diagnosis:       Atypical squamous cells of undetermined significance (ASC-US). Comments:       Specimen was screened at [facility], [address] [place] [id number]      [employer] Screener:  PT     **Electronically Signed Out**         John Au.  SEBASTIÁN Ochoa.  anna/7/27/2023    Procedure/Addendum  HPV Procedure

## 2023-09-20 LAB — SURGICAL PATHOLOGY REPORT: NORMAL

## 2023-10-02 ENCOUNTER — TELEPHONE (OUTPATIENT)
Dept: OBGYN CLINIC | Age: 43
End: 2023-10-02

## 2023-10-02 NOTE — TELEPHONE ENCOUNTER
----- Message from AMADOU Lerner CNP sent at 9/20/2023  9:30 AM EDT -----  Negative endometrial biopsy. According to Dr Get Saab note, patient was to have follow up in 2 weeks to discuss results and plan of care. Please schedule.

## 2023-10-02 NOTE — TELEPHONE ENCOUNTER
Patient notified of results and recommendations, call transferred to [facility], patient to schedule follow up with [place].

## 2023-10-18 ENCOUNTER — OFFICE VISIT (OUTPATIENT)
Dept: OBGYN CLINIC | Age: 43
End: 2023-10-18
Payer: COMMERCIAL

## 2023-10-18 VITALS
SYSTOLIC BLOOD PRESSURE: 114 MMHG | BODY MASS INDEX: 26.98 KG/M2 | HEIGHT: 64 IN | DIASTOLIC BLOOD PRESSURE: 64 MMHG | WEIGHT: 158 LBS

## 2023-10-18 DIAGNOSIS — D25.9 UTERINE LEIOMYOMA, UNSPECIFIED LOCATION: ICD-10-CM

## 2023-10-18 DIAGNOSIS — N92.1 MENORRHAGIA WITH IRREGULAR CYCLE: Primary | ICD-10-CM

## 2023-10-18 DIAGNOSIS — R93.89 ENDOMETRIAL THICKENING ON ULTRASOUND: ICD-10-CM

## 2023-10-18 DIAGNOSIS — R93.89 INCREASED ENDOMETRIAL STRIPE: ICD-10-CM

## 2023-10-18 PROCEDURE — G8427 DOCREV CUR MEDS BY ELIG CLIN: HCPCS | Performed by: OBSTETRICS & GYNECOLOGY

## 2023-10-18 PROCEDURE — 1036F TOBACCO NON-USER: CPT | Performed by: OBSTETRICS & GYNECOLOGY

## 2023-10-18 PROCEDURE — G8484 FLU IMMUNIZE NO ADMIN: HCPCS | Performed by: OBSTETRICS & GYNECOLOGY

## 2023-10-18 PROCEDURE — 99213 OFFICE O/P EST LOW 20 MIN: CPT | Performed by: OBSTETRICS & GYNECOLOGY

## 2023-10-18 PROCEDURE — G8419 CALC BMI OUT NRM PARAM NOF/U: HCPCS | Performed by: OBSTETRICS & GYNECOLOGY

## 2023-10-18 NOTE — PROGRESS NOTES
Discharge  Musculoskeletal ROS: No Arthralgia, Arthritis,Gout,Osteoporosis or Rheumatism  Neurological ROS: No CVA, Migraines, Epilepsy, Seizure Hx, or Limb Weakness  Dermatological ROS: No Rash, Itching, Hives, Mole Changes or Cancer          Blood pressure 114/64, height 1.626 m (5' 4\"), weight 71.7 kg (158 lb), last menstrual period 10/06/2023, not currently breastfeeding. Abdomen: Soft non-tender; good bowel sounds. No guarding, rebound or rigidity. No CVA tenderness bilaterally. Extremities: No calf tenderness, DTR 2/4, and No edema bilaterally    Pelvic: Exam deferred. Diagnostics:  [address] [facility]   [address]; [address]   [place], [address]   [phone] [place] [phone] Fax       7/31/2023     MRN: [id number]   Contact Serial #: [id number]     Melisa Gunter   YOB: 1980   Age: 43 y.o. The ultrasound images were reviewed. Please see the attached ultrasound report. Ultrasonographer: Yamila Mccormick [facility]     Assessment:   Melisa Gunter is a 43 y.o. female   Abnormal ultrasound of uterus   Increased endometrial stripe     Specific Ultrasound Imaging Obtained:   Transabdominal Approach: Yes   Transvaginal Approach: Yes     Limitations of Study Encountered requiring Trans Vaginal imaging:   Overlying bowel & gas limiting the study: No   Poor prep for procedure limiting study: No   Elevated BMI limiting study: No   Ovaries are NOT seen on Transabdominal imaging, required to better visualize structures, or a retroverted uterus is present. Yes     Impressions:   1. The Uterus is heterogeneous and retroverted (9.26 x 7.03 x 4.92 cm)   2. The Endometrial Stripe measurement is 1.77 cm=THICKENED   3. The Left Ovary is without masses or cysts   4. The Right Ovary is without masses or cysts   5. There is not an abnormal amount of cul-de-sac fluid   6.  There are multiple fibroids identified throughout the uterus with

## 2024-07-31 ENCOUNTER — HOSPITAL ENCOUNTER (OUTPATIENT)
Age: 44
Setting detail: SPECIMEN
Discharge: HOME | End: 2024-07-31

## 2024-07-31 ENCOUNTER — OFFICE VISIT (OUTPATIENT)
Dept: OBGYN CLINIC | Age: 44
End: 2024-07-31
Payer: COMMERCIAL

## 2024-07-31 VITALS
SYSTOLIC BLOOD PRESSURE: 110 MMHG | WEIGHT: 144 LBS | BODY MASS INDEX: 24.59 KG/M2 | HEIGHT: 64 IN | DIASTOLIC BLOOD PRESSURE: 74 MMHG

## 2024-07-31 DIAGNOSIS — Z11.51 SPECIAL SCREENING EXAMINATION FOR HUMAN PAPILLOMAVIRUS (HPV): ICD-10-CM

## 2024-07-31 DIAGNOSIS — Z12.31 ENCOUNTER FOR SCREENING MAMMOGRAM FOR MALIGNANT NEOPLASM OF BREAST: ICD-10-CM

## 2024-07-31 DIAGNOSIS — Z01.419 WELL FEMALE EXAM WITH ROUTINE GYNECOLOGICAL EXAM: Primary | ICD-10-CM

## 2024-07-31 PROCEDURE — 99396 PREV VISIT EST AGE 40-64: CPT | Performed by: NURSE PRACTITIONER

## 2024-07-31 ASSESSMENT — PATIENT HEALTH QUESTIONNAIRE - PHQ9
2. FEELING DOWN, DEPRESSED OR HOPELESS: NOT AT ALL
SUM OF ALL RESPONSES TO PHQ QUESTIONS 1-9: 0
SUM OF ALL RESPONSES TO PHQ9 QUESTIONS 1 & 2: 0
SUM OF ALL RESPONSES TO PHQ QUESTIONS 1-9: 0
1. LITTLE INTEREST OR PLEASURE IN DOING THINGS: NOT AT ALL

## 2024-08-04 LAB — CYTOLOGY REPORT: NORMAL

## 2024-08-16 ENCOUNTER — HOSPITAL ENCOUNTER (OUTPATIENT)
Dept: WOMENS IMAGING | Age: 44
Discharge: HOME | End: 2024-08-16
Payer: COMMERCIAL

## 2024-08-16 DIAGNOSIS — Z12.31 ENCOUNTER FOR SCREENING MAMMOGRAM FOR MALIGNANT NEOPLASM OF BREAST: ICD-10-CM

## 2024-08-16 PROCEDURE — 77063 BREAST TOMOSYNTHESIS BI: CPT

## 2025-08-13 ENCOUNTER — HOSPITAL ENCOUNTER (OUTPATIENT)
Age: 45
Setting detail: SPECIMEN
Discharge: HOME | End: 2025-08-13

## 2025-08-13 ENCOUNTER — OFFICE VISIT (OUTPATIENT)
Dept: OBGYN CLINIC | Age: 45
End: 2025-08-13
Payer: COMMERCIAL

## 2025-08-13 VITALS
SYSTOLIC BLOOD PRESSURE: 112 MMHG | WEIGHT: 136 LBS | BODY MASS INDEX: 23.22 KG/M2 | HEIGHT: 64 IN | DIASTOLIC BLOOD PRESSURE: 74 MMHG

## 2025-08-13 DIAGNOSIS — Z12.31 ENCOUNTER FOR SCREENING MAMMOGRAM FOR MALIGNANT NEOPLASM OF BREAST: ICD-10-CM

## 2025-08-13 DIAGNOSIS — Z01.419 WELL FEMALE EXAM WITH ROUTINE GYNECOLOGICAL EXAM: Primary | ICD-10-CM

## 2025-08-13 DIAGNOSIS — Z11.51 SPECIAL SCREENING EXAMINATION FOR HUMAN PAPILLOMAVIRUS (HPV): ICD-10-CM

## 2025-08-13 PROCEDURE — 99396 PREV VISIT EST AGE 40-64: CPT | Performed by: NURSE PRACTITIONER

## 2025-08-29 LAB — CYTOLOGY REPORT: NORMAL
